# Patient Record
Sex: MALE | Race: WHITE | NOT HISPANIC OR LATINO | Employment: UNEMPLOYED | URBAN - METROPOLITAN AREA
[De-identification: names, ages, dates, MRNs, and addresses within clinical notes are randomized per-mention and may not be internally consistent; named-entity substitution may affect disease eponyms.]

---

## 2017-03-10 RX ORDER — GLIMEPIRIDE 4 MG/1
TABLET ORAL
Qty: 180 TABLET | Refills: 0 | Status: SHIPPED | OUTPATIENT
Start: 2017-03-10 | End: 2017-10-17 | Stop reason: SDUPTHER

## 2017-05-26 DIAGNOSIS — E11.9 TYPE 2 DIABETES MELLITUS WITHOUT COMPLICATION: ICD-10-CM

## 2017-10-18 RX ORDER — GLIMEPIRIDE 4 MG/1
TABLET ORAL
Qty: 180 TABLET | Refills: 0 | Status: SHIPPED | OUTPATIENT
Start: 2017-10-18 | End: 2018-01-14 | Stop reason: SDUPTHER

## 2017-10-25 ENCOUNTER — OFFICE VISIT (OUTPATIENT)
Dept: FAMILY MEDICINE | Facility: CLINIC | Age: 77
End: 2017-10-25
Payer: MEDICARE

## 2017-10-25 VITALS
TEMPERATURE: 99 F | DIASTOLIC BLOOD PRESSURE: 68 MMHG | OXYGEN SATURATION: 97 % | HEART RATE: 56 BPM | SYSTOLIC BLOOD PRESSURE: 124 MMHG | BODY MASS INDEX: 30.18 KG/M2 | HEIGHT: 72 IN | WEIGHT: 222.81 LBS

## 2017-10-25 DIAGNOSIS — I10 ESSENTIAL HYPERTENSION: ICD-10-CM

## 2017-10-25 DIAGNOSIS — Z79.4 TYPE 2 DIABETES MELLITUS WITH STAGE 3 CHRONIC KIDNEY DISEASE, WITH LONG-TERM CURRENT USE OF INSULIN: ICD-10-CM

## 2017-10-25 DIAGNOSIS — Z00.00 WELLNESS EXAMINATION: Primary | ICD-10-CM

## 2017-10-25 DIAGNOSIS — Z79.4 TYPE 2 DIABETES MELLITUS WITHOUT COMPLICATION, WITH LONG-TERM CURRENT USE OF INSULIN: ICD-10-CM

## 2017-10-25 DIAGNOSIS — H91.93 BILATERAL DEAFNESS: ICD-10-CM

## 2017-10-25 DIAGNOSIS — E78.5 HYPERLIPIDEMIA, UNSPECIFIED HYPERLIPIDEMIA TYPE: ICD-10-CM

## 2017-10-25 DIAGNOSIS — E11.9 TYPE 2 DIABETES MELLITUS WITHOUT COMPLICATION, WITH LONG-TERM CURRENT USE OF INSULIN: ICD-10-CM

## 2017-10-25 DIAGNOSIS — Z90.5 HISTORY OF NEPHRECTOMY: ICD-10-CM

## 2017-10-25 DIAGNOSIS — N18.2 CKD (CHRONIC KIDNEY DISEASE) STAGE 2, GFR 60-89 ML/MIN: ICD-10-CM

## 2017-10-25 DIAGNOSIS — N18.30 TYPE 2 DIABETES MELLITUS WITH STAGE 3 CHRONIC KIDNEY DISEASE, WITH LONG-TERM CURRENT USE OF INSULIN: ICD-10-CM

## 2017-10-25 DIAGNOSIS — E11.22 TYPE 2 DIABETES MELLITUS WITH STAGE 3 CHRONIC KIDNEY DISEASE, WITH LONG-TERM CURRENT USE OF INSULIN: ICD-10-CM

## 2017-10-25 PROCEDURE — G0008 ADMIN INFLUENZA VIRUS VAC: HCPCS | Mod: S$GLB,,, | Performed by: FAMILY MEDICINE

## 2017-10-25 PROCEDURE — G0009 ADMIN PNEUMOCOCCAL VACCINE: HCPCS | Mod: S$GLB,,, | Performed by: FAMILY MEDICINE

## 2017-10-25 PROCEDURE — 90670 PCV13 VACCINE IM: CPT | Mod: S$GLB,,, | Performed by: FAMILY MEDICINE

## 2017-10-25 PROCEDURE — 90662 IIV NO PRSV INCREASED AG IM: CPT | Mod: S$GLB,,, | Performed by: FAMILY MEDICINE

## 2017-10-25 PROCEDURE — 99397 PER PM REEVAL EST PAT 65+ YR: CPT | Mod: 25,S$GLB,, | Performed by: FAMILY MEDICINE

## 2017-10-25 RX ORDER — ALPRAZOLAM 0.5 MG/1
TABLET ORAL
COMMUNITY
Start: 2017-08-04 | End: 2017-10-25

## 2017-10-25 RX ORDER — SPIRONOLACTONE 25 MG/1
25 TABLET ORAL DAILY
Qty: 90 TABLET | Refills: 3 | Status: SHIPPED | OUTPATIENT
Start: 2017-10-25 | End: 2018-11-17 | Stop reason: SDUPTHER

## 2017-10-25 RX ORDER — CARVEDILOL 25 MG/1
12.5 TABLET ORAL 2 TIMES DAILY WITH MEALS
Qty: 180 TABLET | Refills: 3 | Status: SHIPPED | OUTPATIENT
Start: 2017-10-25 | End: 2017-12-20 | Stop reason: SDUPTHER

## 2017-10-25 RX ORDER — LISINOPRIL 40 MG/1
40 TABLET ORAL DAILY
Qty: 90 TABLET | Refills: 3 | Status: SHIPPED | OUTPATIENT
Start: 2017-10-25 | End: 2019-01-12 | Stop reason: SDUPTHER

## 2017-10-25 RX ORDER — PRAVASTATIN SODIUM 40 MG/1
20 TABLET ORAL DAILY
Qty: 90 TABLET | Refills: 3 | Status: SHIPPED | OUTPATIENT
Start: 2017-10-25 | End: 2018-01-30 | Stop reason: SDUPTHER

## 2017-10-25 NOTE — PROGRESS NOTES
Subjective:      Patient ID: Shawn Calvo is a 77 y.o. male.    Chief Complaint: No chief complaint on file.    Flu shot, wellness and wants to lose weight; bp good and DM good, 7.3 with Dr Antonio Marquez; taking less insuliin      Review of Systems   Constitutional: Negative for appetite change, fatigue, fever and unexpected weight change.   HENT: Negative for congestion, ear pain, sinus pressure and sore throat.    Eyes: Negative for pain and visual disturbance.   Respiratory: Negative for shortness of breath.    Cardiovascular: Negative for chest pain.   Gastrointestinal: Negative for abdominal pain, constipation and diarrhea.   Endocrine: Negative for polyuria.   Genitourinary: Negative for difficulty urinating and frequency.   Musculoskeletal: Negative for arthralgias, back pain and myalgias.   Skin: Negative for color change.   Allergic/Immunologic: Negative.    Neurological: Negative for syncope, weakness and headaches.   Hematological: Does not bruise/bleed easily.   Psychiatric/Behavioral: Negative for dysphoric mood and suicidal ideas. The patient is not nervous/anxious.    All other systems reviewed and are negative.    Objective:     Physical Exam   Constitutional: He is oriented to person, place, and time. He appears well-developed and well-nourished. No distress.   HENT:   Head: Normocephalic and atraumatic.   Right Ear: External ear normal.   Left Ear: External ear normal.   Mouth/Throat: Oropharynx is clear and moist. No oropharyngeal exudate.   halitosis   Eyes: Conjunctivae and EOM are normal. Pupils are equal, round, and reactive to light. Right eye exhibits no discharge. Left eye exhibits no discharge. No scleral icterus.   Neck: Normal range of motion. Neck supple. No JVD present. No tracheal deviation present. No thyromegaly present.   Cardiovascular: Normal rate and regular rhythm.  Exam reveals no gallop and no friction rub.    No murmur heard.  Pulmonary/Chest: Effort normal and breath  sounds normal. No stridor. No respiratory distress. He has no wheezes. He has no rales. He exhibits no tenderness.   Abdominal: Soft. He exhibits no distension and no mass. There is no tenderness. There is no rebound and no guarding.   Musculoskeletal: Normal range of motion. He exhibits no edema or tenderness.   Lymphadenopathy:     He has no cervical adenopathy.   Neurological: He is alert and oriented to person, place, and time. He has normal reflexes. He displays normal reflexes. No cranial nerve deficit. He exhibits normal muscle tone. Coordination normal.   Skin: Skin is warm and dry. No rash noted. He is not diaphoretic. No erythema. No pallor.   Psychiatric: He has a normal mood and affect. His behavior is normal. Judgment and thought content normal.   Nursing note and vitals reviewed.    Assessment:     1. Wellness examination    2. CKD (chronic kidney disease) stage 2, GFR 60-89 ml/min    3. Hyperlipidemia, unspecified hyperlipidemia type    4. Essential hypertension    5. History of nephrectomy    6. Type 2 diabetes mellitus without complication, with long-term current use of insulin    7. Type 2 diabetes mellitus with stage 3 chronic kidney disease, with long-term current use of insulin    8. Bilateral deafness      Plan:     New Prescriptions    No medications on file     Discontinued Medications    ALPRAZOLAM (XANAX) 0.5 MG TABLET         Modified Medications    Modified Medication Previous Medication    CARVEDILOL (COREG) 25 MG TABLET carvedilol (COREG) 25 MG tablet       Take 0.5 tablets (12.5 mg total) by mouth 2 (two) times daily with meals.    Take 0.5 tablets (12.5 mg total) by mouth 2 (two) times daily with meals.    LISINOPRIL (PRINIVIL,ZESTRIL) 40 MG TABLET lisinopril (PRINIVIL,ZESTRIL) 40 MG tablet       Take 1 tablet (40 mg total) by mouth once daily.    Take 1 tablet (40 mg total) by mouth once daily.    PRAVASTATIN (PRAVACHOL) 40 MG TABLET pravastatin (PRAVACHOL) 40 MG tablet       Take  0.5 tablets (20 mg total) by mouth once daily.    Take 0.5 tablets (20 mg total) by mouth once daily.    SPIRONOLACTONE (ALDACTONE) 25 MG TABLET spironolactone (ALDACTONE) 25 MG tablet       Take 1 tablet (25 mg total) by mouth once daily.    Take 1 tablet (25 mg total) by mouth once daily.       Wellness examination    CKD (chronic kidney disease) stage 2, GFR 60-89 ml/min  -     CBC auto differential; Future; Expected date: 10/25/2017  -     Comprehensive metabolic panel; Future; Expected date: 10/25/2017  -     Lipid panel; Future    Hyperlipidemia, unspecified hyperlipidemia type  -     CBC auto differential; Future; Expected date: 10/25/2017  -     Comprehensive metabolic panel; Future; Expected date: 10/25/2017  -     Lipid panel; Future    Essential hypertension  -     carvedilol (COREG) 25 MG tablet; Take 0.5 tablets (12.5 mg total) by mouth 2 (two) times daily with meals.  Dispense: 180 tablet; Refill: 3  -     CBC auto differential; Future; Expected date: 10/25/2017  -     Comprehensive metabolic panel; Future; Expected date: 10/25/2017  -     Lipid panel; Future    History of nephrectomy  -     CBC auto differential; Future; Expected date: 10/25/2017  -     Comprehensive metabolic panel; Future; Expected date: 10/25/2017  -     Lipid panel; Future    Type 2 diabetes mellitus without complication, with long-term current use of insulin  -     CBC auto differential; Future; Expected date: 10/25/2017  -     Comprehensive metabolic panel; Future; Expected date: 10/25/2017  -     Lipid panel; Future    Type 2 diabetes mellitus with stage 3 chronic kidney disease, with long-term current use of insulin  -     CBC auto differential; Future; Expected date: 10/25/2017  -     Comprehensive metabolic panel; Future; Expected date: 10/25/2017  -     Lipid panel; Future    Bilateral deafness  -     CBC auto differential; Future; Expected date: 10/25/2017  -     Comprehensive metabolic panel; Future; Expected date:  10/25/2017  -     Lipid panel; Future    Other orders  -     lisinopril (PRINIVIL,ZESTRIL) 40 MG tablet; Take 1 tablet (40 mg total) by mouth once daily.  Dispense: 90 tablet; Refill: 3  -     spironolactone (ALDACTONE) 25 MG tablet; Take 1 tablet (25 mg total) by mouth once daily.  Dispense: 90 tablet; Refill: 3  -     pravastatin (PRAVACHOL) 40 MG tablet; Take 0.5 tablets (20 mg total) by mouth once daily.  Dispense: 90 tablet; Refill: 3  -     Influenza - High Dose (65+) (PF) (IM)  -     Pneumococcal Conjugate Vaccine (13 Valent) (IM)

## 2017-10-26 LAB
ALBUMIN SERPL-MCNC: 4.2 G/DL (ref 3.6–5.1)
ALBUMIN/GLOB SERPL: 1.4 (CALC) (ref 1–2.5)
ALP SERPL-CCNC: 75 U/L (ref 40–115)
ALT SERPL-CCNC: 13 U/L (ref 9–46)
AST SERPL-CCNC: 17 U/L (ref 10–35)
BASOPHILS # BLD AUTO: 69 CELLS/UL (ref 0–200)
BASOPHILS NFR BLD AUTO: 1 %
BILIRUB SERPL-MCNC: 1.7 MG/DL (ref 0.2–1.2)
BUN SERPL-MCNC: 21 MG/DL (ref 7–25)
BUN/CREAT SERPL: 13 (CALC) (ref 6–22)
CALCIUM SERPL-MCNC: 9.6 MG/DL (ref 8.6–10.3)
CHLORIDE SERPL-SCNC: 105 MMOL/L (ref 98–110)
CHOLEST SERPL-MCNC: 207 MG/DL
CHOLEST/HDLC SERPL: 7.1 (CALC)
CO2 SERPL-SCNC: 26 MMOL/L (ref 20–31)
CREAT SERPL-MCNC: 1.68 MG/DL (ref 0.7–1.18)
EOSINOPHIL # BLD AUTO: 207 CELLS/UL (ref 15–500)
EOSINOPHIL NFR BLD AUTO: 3 %
ERYTHROCYTE [DISTWIDTH] IN BLOOD BY AUTOMATED COUNT: 13.2 % (ref 11–15)
GFR SERPL CREATININE-BSD FRML MDRD: 38 ML/MIN/1.73M2
GLOBULIN SER CALC-MCNC: 3 G/DL (CALC) (ref 1.9–3.7)
GLUCOSE SERPL-MCNC: 96 MG/DL (ref 65–99)
HCT VFR BLD AUTO: 41.6 % (ref 38.5–50)
HDLC SERPL-MCNC: 29 MG/DL
HGB BLD-MCNC: 13.7 G/DL (ref 13.2–17.1)
LDLC SERPL CALC-MCNC: 143 MG/DL (CALC)
LYMPHOCYTES # BLD AUTO: 1884 CELLS/UL (ref 850–3900)
LYMPHOCYTES NFR BLD AUTO: 27.3 %
MCH RBC QN AUTO: 30.5 PG (ref 27–33)
MCHC RBC AUTO-ENTMCNC: 32.9 G/DL (ref 32–36)
MCV RBC AUTO: 92.7 FL (ref 80–100)
MONOCYTES # BLD AUTO: 600 CELLS/UL (ref 200–950)
MONOCYTES NFR BLD AUTO: 8.7 %
NEUTROPHILS # BLD AUTO: 4140 CELLS/UL (ref 1500–7800)
NEUTROPHILS NFR BLD AUTO: 60 %
NONHDLC SERPL-MCNC: 178 MG/DL (CALC)
PLATELET # BLD AUTO: 189 THOUSAND/UL (ref 140–400)
PMV BLD REES-ECKER: 11.5 FL (ref 7.5–12.5)
POTASSIUM SERPL-SCNC: 4.6 MMOL/L (ref 3.5–5.3)
PROT SERPL-MCNC: 7.2 G/DL (ref 6.1–8.1)
RBC # BLD AUTO: 4.49 MILLION/UL (ref 4.2–5.8)
SODIUM SERPL-SCNC: 139 MMOL/L (ref 135–146)
TRIGL SERPL-MCNC: 201 MG/DL
WBC # BLD AUTO: 6.9 THOUSAND/UL (ref 3.8–10.8)

## 2017-11-01 ENCOUNTER — TELEPHONE (OUTPATIENT)
Dept: FAMILY MEDICINE | Facility: CLINIC | Age: 77
End: 2017-11-01

## 2017-11-01 NOTE — TELEPHONE ENCOUNTER
----- Message from Ghazala Nicole sent at 11/1/2017 11:00 AM CDT -----  Contact: [phone]  Please call with blood work results

## 2017-12-20 DIAGNOSIS — I10 ESSENTIAL HYPERTENSION: ICD-10-CM

## 2017-12-22 RX ORDER — CARVEDILOL 25 MG/1
12.5 TABLET ORAL 2 TIMES DAILY WITH MEALS
Qty: 180 TABLET | Refills: 3 | Status: SHIPPED | OUTPATIENT
Start: 2017-12-22 | End: 2021-04-16

## 2017-12-22 RX ORDER — CARVEDILOL 25 MG/1
TABLET ORAL
Qty: 180 TABLET | Refills: 3 | Status: SHIPPED | OUTPATIENT
Start: 2017-12-22 | End: 2021-04-16

## 2018-01-14 RX ORDER — GLIMEPIRIDE 4 MG/1
TABLET ORAL
Qty: 180 TABLET | Refills: 0 | Status: SHIPPED | OUTPATIENT
Start: 2018-01-14 | End: 2018-04-13 | Stop reason: SDUPTHER

## 2018-01-30 ENCOUNTER — TELEPHONE (OUTPATIENT)
Dept: FAMILY MEDICINE | Facility: CLINIC | Age: 78
End: 2018-01-30

## 2018-01-30 RX ORDER — PRAVASTATIN SODIUM 40 MG/1
40 TABLET ORAL DAILY
Qty: 90 TABLET | Refills: 3 | Status: SHIPPED | OUTPATIENT
Start: 2018-01-30 | End: 2018-07-30 | Stop reason: ALTCHOICE

## 2018-01-30 NOTE — TELEPHONE ENCOUNTER
----- Message from Ghazala Nicole sent at 1/30/2018 11:57 AM CST -----  Contact: wife Mariah   Patient is not able to take bp med Carvedilol 25mg 2xday. Started with rash. Wife uncertain if rash is from bp med or cholesterol med. Patient is also on insulin & has one kidney. Wife wants to make sure pt should be on this medication because she is reading that he shouldn't due to insulin

## 2018-04-13 DIAGNOSIS — N18.30 TYPE 2 DIABETES MELLITUS WITH STAGE 3 CHRONIC KIDNEY DISEASE, WITH LONG-TERM CURRENT USE OF INSULIN: Primary | ICD-10-CM

## 2018-04-13 DIAGNOSIS — Z79.4 TYPE 2 DIABETES MELLITUS WITH STAGE 3 CHRONIC KIDNEY DISEASE, WITH LONG-TERM CURRENT USE OF INSULIN: Primary | ICD-10-CM

## 2018-04-13 DIAGNOSIS — E11.22 TYPE 2 DIABETES MELLITUS WITH STAGE 3 CHRONIC KIDNEY DISEASE, WITH LONG-TERM CURRENT USE OF INSULIN: Primary | ICD-10-CM

## 2018-04-13 DIAGNOSIS — I10 ESSENTIAL HYPERTENSION: ICD-10-CM

## 2018-04-14 RX ORDER — GLIMEPIRIDE 4 MG/1
TABLET ORAL
Qty: 180 TABLET | Refills: 0 | Status: SHIPPED | OUTPATIENT
Start: 2018-04-14 | End: 2018-07-11 | Stop reason: SDUPTHER

## 2018-04-16 ENCOUNTER — TELEPHONE (OUTPATIENT)
Dept: ADMINISTRATIVE | Facility: HOSPITAL | Age: 78
End: 2018-04-16

## 2018-04-16 NOTE — TELEPHONE ENCOUNTER
Spoke to pt wife, states that all labs have been completed by pt endocrinologist Dr. Antonio Marquez in [place].  Records requested.

## 2018-05-11 DIAGNOSIS — N18.9 CHRONIC KIDNEY DISEASE, UNSPECIFIED CKD STAGE: ICD-10-CM

## 2018-07-15 ENCOUNTER — TELEPHONE (OUTPATIENT)
Dept: FAMILY MEDICINE | Facility: CLINIC | Age: 78
End: 2018-07-15

## 2018-07-15 DIAGNOSIS — Z79.4 TYPE 2 DIABETES MELLITUS WITHOUT COMPLICATION, WITH LONG-TERM CURRENT USE OF INSULIN: ICD-10-CM

## 2018-07-15 DIAGNOSIS — I10 ESSENTIAL HYPERTENSION: Primary | ICD-10-CM

## 2018-07-15 DIAGNOSIS — E11.9 TYPE 2 DIABETES MELLITUS WITHOUT COMPLICATION, WITH LONG-TERM CURRENT USE OF INSULIN: ICD-10-CM

## 2018-07-15 DIAGNOSIS — Z79.899 ENCOUNTER FOR LONG-TERM CURRENT USE OF MEDICATION: ICD-10-CM

## 2018-07-15 RX ORDER — GLIMEPIRIDE 4 MG/1
TABLET ORAL
Qty: 180 TABLET | Refills: 1 | Status: SHIPPED | OUTPATIENT
Start: 2018-07-15 | End: 2019-01-17 | Stop reason: SDUPTHER

## 2018-07-17 NOTE — TELEPHONE ENCOUNTER
Pt states that his A1c was drawn by Dr Marquez, but he will go in for all of his other testing.  His wife will have them fax us a copy of the A1c.     He is requesting a TSH as well.

## 2018-07-19 ENCOUNTER — LAB VISIT (OUTPATIENT)
Dept: LAB | Facility: HOSPITAL | Age: 78
End: 2018-07-19
Attending: FAMILY MEDICINE
Payer: MEDICARE

## 2018-07-19 DIAGNOSIS — E11.9 TYPE 2 DIABETES MELLITUS WITHOUT COMPLICATION, WITH LONG-TERM CURRENT USE OF INSULIN: ICD-10-CM

## 2018-07-19 DIAGNOSIS — E11.22 TYPE 2 DIABETES MELLITUS WITH STAGE 3 CHRONIC KIDNEY DISEASE, WITH LONG-TERM CURRENT USE OF INSULIN: ICD-10-CM

## 2018-07-19 DIAGNOSIS — Z79.899 ENCOUNTER FOR LONG-TERM CURRENT USE OF MEDICATION: ICD-10-CM

## 2018-07-19 DIAGNOSIS — N18.30 TYPE 2 DIABETES MELLITUS WITH STAGE 3 CHRONIC KIDNEY DISEASE, WITH LONG-TERM CURRENT USE OF INSULIN: ICD-10-CM

## 2018-07-19 DIAGNOSIS — Z79.4 TYPE 2 DIABETES MELLITUS WITHOUT COMPLICATION, WITH LONG-TERM CURRENT USE OF INSULIN: ICD-10-CM

## 2018-07-19 DIAGNOSIS — I10 ESSENTIAL HYPERTENSION: ICD-10-CM

## 2018-07-19 DIAGNOSIS — Z79.4 TYPE 2 DIABETES MELLITUS WITH STAGE 3 CHRONIC KIDNEY DISEASE, WITH LONG-TERM CURRENT USE OF INSULIN: ICD-10-CM

## 2018-07-19 LAB
ALBUMIN SERPL BCP-MCNC: 4.2 G/DL
ALP SERPL-CCNC: 43 U/L
ALT SERPL W/O P-5'-P-CCNC: 26 U/L
ANION GAP SERPL CALC-SCNC: 9 MMOL/L
AST SERPL-CCNC: 28 U/L
BASOPHILS # BLD AUTO: 0.04 K/UL
BASOPHILS NFR BLD: 0.7 %
BILIRUB SERPL-MCNC: 0.8 MG/DL
BILIRUB UR QL STRIP: NEGATIVE
BUN SERPL-MCNC: 23 MG/DL
CALCIUM SERPL-MCNC: 9.5 MG/DL
CHLORIDE SERPL-SCNC: 111 MMOL/L
CHOLEST SERPL-MCNC: 172 MG/DL
CHOLEST/HDLC SERPL: 5.7 {RATIO}
CLARITY UR REFRACT.AUTO: CLEAR
CO2 SERPL-SCNC: 22 MMOL/L
COLOR UR AUTO: YELLOW
CREAT SERPL-MCNC: 2.02 MG/DL
CREAT UR-MCNC: 141 MG/DL
DIFFERENTIAL METHOD: ABNORMAL
EOSINOPHIL # BLD AUTO: 0.2 K/UL
EOSINOPHIL NFR BLD: 3 %
ERYTHROCYTE [DISTWIDTH] IN BLOOD BY AUTOMATED COUNT: 13 %
EST. GFR  (AFRICAN AMERICAN): 35.5 ML/MIN/1.73 M^2
EST. GFR  (NON AFRICAN AMERICAN): 30.7 ML/MIN/1.73 M^2
GLUCOSE SERPL-MCNC: 81 MG/DL
GLUCOSE UR QL STRIP: NEGATIVE
HCT VFR BLD AUTO: 40.7 %
HDLC SERPL-MCNC: 30 MG/DL
HDLC SERPL: 17.4 %
HGB BLD-MCNC: 13.4 G/DL
HGB UR QL STRIP: ABNORMAL
KETONES UR QL STRIP: NEGATIVE
LDLC SERPL CALC-MCNC: 122 MG/DL
LEUKOCYTE ESTERASE UR QL STRIP: NEGATIVE
LYMPHOCYTES # BLD AUTO: 1.6 K/UL
LYMPHOCYTES NFR BLD: 27.1 %
MCH RBC QN AUTO: 32.1 PG
MCHC RBC AUTO-ENTMCNC: 32.9 G/DL
MCV RBC AUTO: 97 FL
MICROALBUMIN UR DL<=1MG/L-MCNC: 22 UG/ML
MICROALBUMIN/CREATININE RATIO: 15.6 UG/MG
MONOCYTES # BLD AUTO: 0.6 K/UL
MONOCYTES NFR BLD: 9.7 %
NEUTROPHILS # BLD AUTO: 3.4 K/UL
NEUTROPHILS NFR BLD: 59.2 %
NITRITE UR QL STRIP: NEGATIVE
NONHDLC SERPL-MCNC: 142 MG/DL
PH UR STRIP: 5 [PH] (ref 5–8)
PLATELET # BLD AUTO: 182 K/UL
PMV BLD AUTO: 11.4 FL
POTASSIUM SERPL-SCNC: 4.2 MMOL/L
PROT SERPL-MCNC: 7.4 G/DL
PROT UR QL STRIP: NEGATIVE
RBC # BLD AUTO: 4.18 M/UL
SODIUM SERPL-SCNC: 142 MMOL/L
SP GR UR STRIP: 1.01 (ref 1–1.03)
TRIGL SERPL-MCNC: 100 MG/DL
TSH SERPL DL<=0.005 MIU/L-ACNC: 2.31 UIU/ML
URN SPEC COLLECT METH UR: ABNORMAL
UROBILINOGEN UR STRIP-ACNC: NEGATIVE EU/DL
WBC # BLD AUTO: 5.75 K/UL

## 2018-07-19 PROCEDURE — 36415 COLL VENOUS BLD VENIPUNCTURE: CPT | Mod: PO

## 2018-07-19 PROCEDURE — 84443 ASSAY THYROID STIM HORMONE: CPT | Mod: PO

## 2018-07-19 PROCEDURE — 80061 LIPID PANEL: CPT

## 2018-07-19 PROCEDURE — 81003 URINALYSIS AUTO W/O SCOPE: CPT | Mod: PO

## 2018-07-19 PROCEDURE — 85025 COMPLETE CBC W/AUTO DIFF WBC: CPT | Mod: PO

## 2018-07-19 PROCEDURE — 80053 COMPREHEN METABOLIC PANEL: CPT | Mod: PO

## 2018-07-19 PROCEDURE — 82043 UR ALBUMIN QUANTITATIVE: CPT | Mod: PO

## 2018-07-23 ENCOUNTER — TELEPHONE (OUTPATIENT)
Dept: FAMILY MEDICINE | Facility: CLINIC | Age: 78
End: 2018-07-23

## 2018-07-23 NOTE — TELEPHONE ENCOUNTER
----- Message from Vinay Plascencia MD sent at 7/20/2018  7:20 PM CDT -----  A1C didn't get done; needs to RTC for that

## 2018-07-30 ENCOUNTER — TELEPHONE (OUTPATIENT)
Dept: FAMILY MEDICINE | Facility: CLINIC | Age: 78
End: 2018-07-30

## 2018-07-30 DIAGNOSIS — E78.5 HYPERLIPIDEMIA, UNSPECIFIED HYPERLIPIDEMIA TYPE: Primary | ICD-10-CM

## 2018-07-30 RX ORDER — ROSUVASTATIN CALCIUM 10 MG/1
10 TABLET, COATED ORAL DAILY
Qty: 90 TABLET | Refills: 3 | Status: SHIPPED | OUTPATIENT
Start: 2018-07-30 | End: 2021-04-16

## 2018-07-30 NOTE — TELEPHONE ENCOUNTER
----- Message from Zunilda Eugene sent at 7/30/2018  2:11 PM CDT -----  Contact: [phone] wife  Patient's spouse calling in regarding test results. Please call and advise.

## 2018-07-30 NOTE — TELEPHONE ENCOUNTER
Called pts wife back to discuss labs and explain that the A1c did not get drawn and he will need to go and get that done. No answer left message on VM to call office back

## 2018-07-30 NOTE — TELEPHONE ENCOUNTER
----- Message from Zunilda Eugene sent at 7/30/2018  2:11 PM CDT -----  Contact: [phone] wife  Patient's spouse calling in regarding test results. Please call and advise.

## 2018-07-30 NOTE — TELEPHONE ENCOUNTER
Left detailed message advising patient; that we do have results from [facility] but we are missing A1c, patient advised to return call.        ----- Message from Tania Rubio sent at 7/30/2018  2:40 PM CDT -----  Contact: [phone] /self  Patient called in returning your call. The lab results are at [facility]. Dr. Antonio Phillips already has those results. Please advise.

## 2018-10-24 ENCOUNTER — CLINICAL SUPPORT (OUTPATIENT)
Dept: FAMILY MEDICINE | Facility: CLINIC | Age: 78
End: 2018-10-24
Payer: MEDICARE

## 2018-10-24 PROCEDURE — G0008 ADMIN INFLUENZA VIRUS VAC: HCPCS | Mod: S$GLB,,, | Performed by: FAMILY MEDICINE

## 2018-10-24 PROCEDURE — G0008 FLU VACCINE - HIGH DOSE (65+) PRESERVATIVE FREE IM: ICD-10-PCS | Mod: S$GLB,,, | Performed by: FAMILY MEDICINE

## 2018-10-24 PROCEDURE — 90662 IIV NO PRSV INCREASED AG IM: CPT | Mod: S$GLB,,, | Performed by: FAMILY MEDICINE

## 2018-10-24 PROCEDURE — 90662 FLU VACCINE - HIGH DOSE (65+) PRESERVATIVE FREE IM: ICD-10-PCS | Mod: S$GLB,,, | Performed by: FAMILY MEDICINE

## 2018-11-18 RX ORDER — SPIRONOLACTONE 25 MG/1
TABLET ORAL
Qty: 90 TABLET | Refills: 3 | Status: SHIPPED | OUTPATIENT
Start: 2018-11-18 | End: 2019-10-28 | Stop reason: SDUPTHER

## 2019-01-13 RX ORDER — LISINOPRIL 40 MG/1
TABLET ORAL
Qty: 90 TABLET | Refills: 1 | Status: SHIPPED | OUTPATIENT
Start: 2019-01-13 | End: 2019-07-07 | Stop reason: SDUPTHER

## 2019-01-17 RX ORDER — GLIMEPIRIDE 4 MG/1
TABLET ORAL
Qty: 180 TABLET | Refills: 1 | Status: SHIPPED | OUTPATIENT
Start: 2019-01-17 | End: 2019-07-07 | Stop reason: SDUPTHER

## 2019-01-18 DIAGNOSIS — I10 ESSENTIAL HYPERTENSION: ICD-10-CM

## 2019-01-21 RX ORDER — CARVEDILOL 25 MG/1
TABLET ORAL
Qty: 90 TABLET | Refills: 7 | Status: SHIPPED | OUTPATIENT
Start: 2019-01-21 | End: 2020-02-02

## 2019-04-05 DIAGNOSIS — N18.9 CHRONIC KIDNEY DISEASE, UNSPECIFIED CKD STAGE: ICD-10-CM

## 2019-04-10 ENCOUNTER — TELEPHONE (OUTPATIENT)
Dept: ADMINISTRATIVE | Facility: HOSPITAL | Age: 79
End: 2019-04-10

## 2019-04-10 ENCOUNTER — PATIENT OUTREACH (OUTPATIENT)
Dept: ADMINISTRATIVE | Facility: HOSPITAL | Age: 79
End: 2019-04-10

## 2019-07-07 RX ORDER — GLIMEPIRIDE 4 MG/1
TABLET ORAL
Qty: 180 TABLET | Refills: 0 | Status: SHIPPED | OUTPATIENT
Start: 2019-07-07 | End: 2019-10-13 | Stop reason: SDUPTHER

## 2019-07-07 RX ORDER — LISINOPRIL 40 MG/1
TABLET ORAL
Qty: 90 TABLET | Refills: 0 | Status: SHIPPED | OUTPATIENT
Start: 2019-07-07 | End: 2019-10-13 | Stop reason: SDUPTHER

## 2019-07-07 NOTE — LETTER
July 9, 2019    Shawn Calvo  [address] [address]  Phone: [phone]  Fax: [phone] Dear Deo Umesh:    Our records indicate that it is time for you to return to clinic for follow up with Dr. Plascencia. Please contact our office to schedule an appointment.    Our phone number is [phone]          Sincerely,        Dr. Plascencia's staff      Refill request   LOV 3/5/19 Ulcerative pancolitis without complication  NOV 9/5/19  Last ordered  ALPRAZolam (XANAX) 2 MG tablet 90 tablet 2 3/5/2019     Sig - Route: Take 1 tablet by mouth every 6 hours as needed for Sleep or Anxiety. - Oral

## 2019-10-14 RX ORDER — LISINOPRIL 40 MG/1
TABLET ORAL
Qty: 90 TABLET | Refills: 3 | Status: SHIPPED | OUTPATIENT
Start: 2019-10-14 | End: 2020-04-21

## 2019-10-14 RX ORDER — GLIMEPIRIDE 4 MG/1
TABLET ORAL
Qty: 180 TABLET | Refills: 3 | Status: SHIPPED | OUTPATIENT
Start: 2019-10-14 | End: 2020-12-09

## 2019-10-29 RX ORDER — SPIRONOLACTONE 25 MG/1
TABLET ORAL
Qty: 90 TABLET | Refills: 3 | Status: SHIPPED | OUTPATIENT
Start: 2019-10-29 | End: 2021-04-16

## 2019-11-26 ENCOUNTER — PATIENT OUTREACH (OUTPATIENT)
Dept: ADMINISTRATIVE | Facility: HOSPITAL | Age: 79
End: 2019-11-26

## 2020-02-02 DIAGNOSIS — I10 ESSENTIAL HYPERTENSION: ICD-10-CM

## 2020-02-02 RX ORDER — CARVEDILOL 25 MG/1
TABLET ORAL
Qty: 90 TABLET | Refills: 3 | Status: SHIPPED | OUTPATIENT
Start: 2020-02-02 | End: 2020-11-17

## 2020-04-21 RX ORDER — LISINOPRIL 40 MG/1
TABLET ORAL
Qty: 90 TABLET | Refills: 3 | Status: SHIPPED | OUTPATIENT
Start: 2020-04-21 | End: 2021-06-07

## 2020-04-23 ENCOUNTER — TELEPHONE (OUTPATIENT)
Dept: FAMILY MEDICINE | Facility: CLINIC | Age: 80
End: 2020-04-23

## 2020-04-23 NOTE — TELEPHONE ENCOUNTER
[facility] pharmacy is wanting to notify you that there is a drug interaction between Lisinopril and Spironolactone. There is an increased risk of hypercalcemia. Is it okay for pt to take both medications?    [facility] pharmacy: [phone]

## 2020-10-07 ENCOUNTER — PATIENT OUTREACH (OUTPATIENT)
Dept: ADMINISTRATIVE | Facility: HOSPITAL | Age: 80
End: 2020-10-07

## 2021-02-01 ENCOUNTER — TELEPHONE (OUTPATIENT)
Dept: FAMILY MEDICINE | Facility: CLINIC | Age: 81
End: 2021-02-01

## 2021-02-01 DIAGNOSIS — I10 ESSENTIAL HYPERTENSION: ICD-10-CM

## 2021-02-02 RX ORDER — CARVEDILOL 25 MG/1
TABLET ORAL
Qty: 90 TABLET | Refills: 0 | Status: SHIPPED | OUTPATIENT
Start: 2021-02-02 | End: 2021-05-07

## 2021-02-17 LAB — HBA1C MFR BLD: 7.3 % (ref 4–6)

## 2021-03-17 ENCOUNTER — PATIENT OUTREACH (OUTPATIENT)
Dept: ADMINISTRATIVE | Facility: HOSPITAL | Age: 81
End: 2021-03-17

## 2021-03-17 DIAGNOSIS — N18.31 TYPE 2 DIABETES MELLITUS WITH STAGE 3A CHRONIC KIDNEY DISEASE, WITH LONG-TERM CURRENT USE OF INSULIN: Primary | ICD-10-CM

## 2021-03-17 DIAGNOSIS — E11.22 TYPE 2 DIABETES MELLITUS WITH STAGE 3A CHRONIC KIDNEY DISEASE, WITH LONG-TERM CURRENT USE OF INSULIN: Primary | ICD-10-CM

## 2021-03-17 DIAGNOSIS — Z79.4 TYPE 2 DIABETES MELLITUS WITH STAGE 3A CHRONIC KIDNEY DISEASE, WITH LONG-TERM CURRENT USE OF INSULIN: Primary | ICD-10-CM

## 2021-04-12 ENCOUNTER — PATIENT OUTREACH (OUTPATIENT)
Dept: ADMINISTRATIVE | Facility: HOSPITAL | Age: 81
End: 2021-04-12

## 2021-04-16 ENCOUNTER — OFFICE VISIT (OUTPATIENT)
Dept: FAMILY MEDICINE | Facility: CLINIC | Age: 81
End: 2021-04-16
Payer: MEDICARE

## 2021-04-16 VITALS
TEMPERATURE: 98 F | SYSTOLIC BLOOD PRESSURE: 124 MMHG | HEART RATE: 59 BPM | BODY MASS INDEX: 29.8 KG/M2 | HEIGHT: 72 IN | OXYGEN SATURATION: 98 % | WEIGHT: 220 LBS | DIASTOLIC BLOOD PRESSURE: 86 MMHG

## 2021-04-16 DIAGNOSIS — Z00.00 WELLNESS EXAMINATION: Primary | ICD-10-CM

## 2021-04-16 DIAGNOSIS — J34.89 LESION OF NOSE: ICD-10-CM

## 2021-04-16 PROCEDURE — 99203 OFFICE O/P NEW LOW 30 MIN: CPT | Mod: S$GLB,,, | Performed by: FAMILY MEDICINE

## 2021-04-16 PROCEDURE — 99203 PR OFFICE/OUTPT VISIT, NEW, LEVL III, 30-44 MIN: ICD-10-PCS | Mod: S$GLB,,, | Performed by: FAMILY MEDICINE

## 2021-04-16 RX ORDER — LOSARTAN POTASSIUM 100 MG/1
100 TABLET ORAL DAILY
COMMUNITY
Start: 2021-03-29

## 2021-04-16 RX ORDER — PRAVASTATIN SODIUM 80 MG/1
80 TABLET ORAL DAILY
COMMUNITY

## 2021-05-06 DIAGNOSIS — I10 ESSENTIAL HYPERTENSION: ICD-10-CM

## 2021-05-07 RX ORDER — CARVEDILOL 25 MG/1
TABLET ORAL
Qty: 90 TABLET | Refills: 3 | Status: SHIPPED | OUTPATIENT
Start: 2021-05-07 | End: 2021-05-12 | Stop reason: SDUPTHER

## 2021-05-12 DIAGNOSIS — I10 ESSENTIAL HYPERTENSION: ICD-10-CM

## 2021-05-13 RX ORDER — CARVEDILOL 25 MG/1
TABLET ORAL
Qty: 90 TABLET | Refills: 3 | Status: SHIPPED | OUTPATIENT
Start: 2021-05-13 | End: 2022-07-27

## 2021-09-15 RX ORDER — LISINOPRIL 40 MG/1
TABLET ORAL
Qty: 90 TABLET | Refills: 1 | Status: SHIPPED | OUTPATIENT
Start: 2021-09-15 | End: 2022-03-11

## 2022-01-18 LAB — HBA1C MFR BLD: 7.5 %

## 2022-02-04 NOTE — TELEPHONE ENCOUNTER
Care Due:                  Date            Visit Type   Department     Provider  --------------------------------------------------------------------------------                                EP -                              PRIMARY      [facility] FAMILY  Last Visit: 04-      CARE ([facility])   MEDICINE       Vinay Plascencia  Next Visit: None Scheduled  None         None Found                                                            Last  Test          Frequency    Reason                     Performed    Due Date  --------------------------------------------------------------------------------    Office Visit  12 months..  glimepiride, lisinopriL..  04- 04-    CMP.........  12 months..  glimepiride, lisinopriL..  Not Found    Overdue    HBA1C.......  6 months...  glimepiride..............  02- 08-    Powered by [employer]. Reference number: [id number].   2/04/2022 11:22:01 AM CST   Patient attended Phase 2 Cardiac Rehab Exercise Session. Further documentation will be completed in [facility] [facility] and will be scanned into the medical record upon discharge.

## 2022-02-07 RX ORDER — GLIMEPIRIDE 4 MG/1
TABLET ORAL
Qty: 180 TABLET | Refills: 0 | Status: SHIPPED | OUTPATIENT
Start: 2022-02-07 | End: 2022-05-22

## 2022-02-09 NOTE — TELEPHONE ENCOUNTER
Called and spoke to wife.  She is requesting that you order his complete labs at [facility].      He will see you in April.     I also spoke to Dr Marquez's office to send the most recent labs.

## 2022-02-11 DIAGNOSIS — E11.22 TYPE 2 DIABETES MELLITUS WITH STAGE 3A CHRONIC KIDNEY DISEASE, WITH LONG-TERM CURRENT USE OF INSULIN: Primary | ICD-10-CM

## 2022-02-11 DIAGNOSIS — N18.31 TYPE 2 DIABETES MELLITUS WITH STAGE 3A CHRONIC KIDNEY DISEASE, WITH LONG-TERM CURRENT USE OF INSULIN: Primary | ICD-10-CM

## 2022-02-11 DIAGNOSIS — Z79.4 TYPE 2 DIABETES MELLITUS WITH STAGE 3A CHRONIC KIDNEY DISEASE, WITH LONG-TERM CURRENT USE OF INSULIN: Primary | ICD-10-CM

## 2022-02-22 ENCOUNTER — PATIENT OUTREACH (OUTPATIENT)
Dept: ADMINISTRATIVE | Facility: HOSPITAL | Age: 82
End: 2022-02-22
Payer: MEDICARE

## 2022-03-10 NOTE — TELEPHONE ENCOUNTER
No new care gaps identified.  Powered by [employer]. Reference number: [id number].   3/10/2022 9:51:01 AM CST

## 2022-03-10 NOTE — TELEPHONE ENCOUNTER
This Rx Request does not qualify for assessment with the [facility]   Please review protocol details and the Care Due Message for extra clinical information    Reasons Rx Request may be deferred:  1. Labs/Vitals overdue  2. Labs/Vitals abnormal  3. Patient has been seen in the ED/Hospital since the last PCP visit  4. Medication is non-delegated  5. Medication associated diagnosis code is outside of scope  6. Provider is a non-participating provider  7. Visit criteria not met (Patient needs to be seen at least every 15 months by PCP)    Note composed:10:00 AM 03/10/2022

## 2022-03-11 RX ORDER — LISINOPRIL 40 MG/1
TABLET ORAL
Qty: 90 TABLET | Refills: 3 | Status: SHIPPED | OUTPATIENT
Start: 2022-03-11

## 2022-03-24 ENCOUNTER — TELEPHONE (OUTPATIENT)
Dept: FAMILY MEDICINE | Facility: CLINIC | Age: 82
End: 2022-03-24
Payer: MEDICARE

## 2022-03-24 NOTE — TELEPHONE ENCOUNTER
Pt's wife called to inform you that pt is being followed by Dr. Marquez- endocrinology. The pt does not want you to order any of his diabetic labs, as he will be having them drawn late April for Dr. Marquez. Pt is scheduled for a visit with you on 4/6/22.

## 2022-03-24 NOTE — TELEPHONE ENCOUNTER
----- Message from Loli Bonilla sent at 3/24/2022  9:57 AM CDT -----  Type:  Needs Medical Advice    Who Called:  pt  Symptoms (please be specific):  would like to discuss Fasting lab appt     Would the patient rather a call back or a response via [facility]?  Call   Best Call Back Number:  [phone]  Additional Information:

## 2022-03-28 ENCOUNTER — TELEPHONE (OUTPATIENT)
Dept: ADMINISTRATIVE | Facility: HOSPITAL | Age: 82
End: 2022-03-28
Payer: MEDICARE

## 2022-03-28 NOTE — TELEPHONE ENCOUNTER
Attempted to reach patient's wife for lab appointment clarification, as Dr Plascencia did order other labs besides the diabetic labs. Wanted to know if the patient would get the other labs drawn. No answer. Left message.

## 2022-03-29 ENCOUNTER — LAB VISIT (OUTPATIENT)
Dept: LAB | Facility: HOSPITAL | Age: 82
End: 2022-03-29
Attending: FAMILY MEDICINE
Payer: MEDICARE

## 2022-03-29 DIAGNOSIS — E11.22 TYPE 2 DIABETES MELLITUS WITH STAGE 3A CHRONIC KIDNEY DISEASE, WITH LONG-TERM CURRENT USE OF INSULIN: ICD-10-CM

## 2022-03-29 DIAGNOSIS — Z79.4 TYPE 2 DIABETES MELLITUS WITH STAGE 3A CHRONIC KIDNEY DISEASE, WITH LONG-TERM CURRENT USE OF INSULIN: ICD-10-CM

## 2022-03-29 DIAGNOSIS — N18.31 TYPE 2 DIABETES MELLITUS WITH STAGE 3A CHRONIC KIDNEY DISEASE, WITH LONG-TERM CURRENT USE OF INSULIN: ICD-10-CM

## 2022-03-29 LAB
ALBUMIN SERPL BCP-MCNC: 4.4 G/DL (ref 3.5–5.2)
ALP SERPL-CCNC: 72 U/L (ref 38–126)
ALT SERPL W/O P-5'-P-CCNC: 17 U/L (ref 10–44)
ANION GAP SERPL CALC-SCNC: 11 MMOL/L (ref 8–16)
AST SERPL-CCNC: 23 U/L (ref 15–46)
BACTERIA #/AREA URNS AUTO: NORMAL /HPF
BASOPHILS # BLD AUTO: 0.06 K/UL (ref 0–0.2)
BASOPHILS NFR BLD: 0.8 % (ref 0–1.9)
BILIRUB SERPL-MCNC: 1.6 MG/DL (ref 0.1–1)
BILIRUB UR QL STRIP: NEGATIVE
CALCIUM SERPL-MCNC: 9.9 MG/DL (ref 8.7–10.5)
CHLORIDE SERPL-SCNC: 106 MMOL/L (ref 95–110)
CHOLEST SERPL-MCNC: 172 MG/DL (ref 120–199)
CHOLEST/HDLC SERPL: 4.2 {RATIO} (ref 2–5)
CLARITY UR REFRACT.AUTO: CLEAR
CO2 SERPL-SCNC: 25 MMOL/L (ref 23–29)
COLOR UR AUTO: YELLOW
CREAT SERPL-MCNC: 1.56 MG/DL (ref 0.5–1.4)
DIFFERENTIAL METHOD: ABNORMAL
EOSINOPHIL # BLD AUTO: 0.2 K/UL (ref 0–0.5)
EOSINOPHIL NFR BLD: 2.7 % (ref 0–8)
ERYTHROCYTE [DISTWIDTH] IN BLOOD BY AUTOMATED COUNT: 12.1 % (ref 11.5–14.5)
EST. GFR  (AFRICAN AMERICAN): 47.1 ML/MIN/1.73 M^2
EST. GFR  (NON AFRICAN AMERICAN): 40.8 ML/MIN/1.73 M^2
GLUCOSE SERPL-MCNC: 144 MG/DL (ref 70–110)
GLUCOSE UR QL STRIP: NEGATIVE
HCT VFR BLD AUTO: 43.4 % (ref 40–54)
HDLC SERPL-MCNC: 41 MG/DL (ref 40–75)
HDLC SERPL: 23.8 % (ref 20–50)
HGB BLD-MCNC: 14.5 G/DL (ref 14–18)
HGB UR QL STRIP: NEGATIVE
HYALINE CASTS UR QL AUTO: 0 /LPF
IMM GRANULOCYTES # BLD AUTO: 0.04 K/UL (ref 0–0.04)
IMM GRANULOCYTES NFR BLD AUTO: 0.5 % (ref 0–0.5)
KETONES UR QL STRIP: NEGATIVE
LDLC SERPL CALC-MCNC: 109.8 MG/DL (ref 63–159)
LEUKOCYTE ESTERASE UR QL STRIP: NEGATIVE
LYMPHOCYTES # BLD AUTO: 1.7 K/UL (ref 1–4.8)
LYMPHOCYTES NFR BLD: 23.1 % (ref 18–48)
MCH RBC QN AUTO: 32.8 PG (ref 27–31)
MCHC RBC AUTO-ENTMCNC: 33.4 G/DL (ref 32–36)
MCV RBC AUTO: 98 FL (ref 82–98)
MICROSCOPIC COMMENT: NORMAL
MONOCYTES # BLD AUTO: 0.6 K/UL (ref 0.3–1)
MONOCYTES NFR BLD: 8.2 % (ref 4–15)
NEUTROPHILS # BLD AUTO: 4.8 K/UL (ref 1.8–7.7)
NEUTROPHILS NFR BLD: 64.7 % (ref 38–73)
NITRITE UR QL STRIP: NEGATIVE
NONHDLC SERPL-MCNC: 131 MG/DL
NRBC BLD-RTO: 0 /100 WBC
PH UR STRIP: 6 [PH] (ref 5–8)
PLATELET # BLD AUTO: 190 K/UL (ref 150–450)
PMV BLD AUTO: 11.4 FL (ref 9.2–12.9)
POTASSIUM SERPL-SCNC: 4.5 MMOL/L (ref 3.5–5.1)
PROT SERPL-MCNC: 7.6 G/DL (ref 6–8.4)
PROT UR QL STRIP: ABNORMAL
RBC # BLD AUTO: 4.42 M/UL (ref 4.6–6.2)
RBC #/AREA URNS AUTO: 0 /HPF (ref 0–4)
SODIUM SERPL-SCNC: 142 MMOL/L (ref 136–145)
SP GR UR STRIP: 1.01 (ref 1–1.03)
TRIGL SERPL-MCNC: 106 MG/DL (ref 30–150)
TSH SERPL DL<=0.005 MIU/L-ACNC: 2.51 UIU/ML (ref 0.4–4)
URN SPEC COLLECT METH UR: ABNORMAL
UROBILINOGEN UR STRIP-ACNC: NEGATIVE EU/DL
UUN UR-MCNC: 23 MG/DL (ref 2–20)
WBC # BLD AUTO: 7.48 K/UL (ref 3.9–12.7)
WBC #/AREA URNS AUTO: 0 /HPF (ref 0–5)

## 2022-03-29 PROCEDURE — 81000 URINALYSIS NONAUTO W/SCOPE: CPT | Mod: PO | Performed by: FAMILY MEDICINE

## 2022-03-29 PROCEDURE — 36415 COLL VENOUS BLD VENIPUNCTURE: CPT | Mod: PO | Performed by: FAMILY MEDICINE

## 2022-03-29 PROCEDURE — 80061 LIPID PANEL: CPT | Performed by: FAMILY MEDICINE

## 2022-03-29 PROCEDURE — 80053 COMPREHEN METABOLIC PANEL: CPT | Mod: PO | Performed by: FAMILY MEDICINE

## 2022-03-29 PROCEDURE — 84443 ASSAY THYROID STIM HORMONE: CPT | Mod: PO | Performed by: FAMILY MEDICINE

## 2022-03-29 PROCEDURE — 85025 COMPLETE CBC W/AUTO DIFF WBC: CPT | Mod: PO | Performed by: FAMILY MEDICINE

## 2022-04-06 ENCOUNTER — OFFICE VISIT (OUTPATIENT)
Dept: FAMILY MEDICINE | Facility: CLINIC | Age: 82
End: 2022-04-06
Payer: MEDICARE

## 2022-04-06 ENCOUNTER — TELEPHONE (OUTPATIENT)
Dept: FAMILY MEDICINE | Facility: CLINIC | Age: 82
End: 2022-04-06
Payer: MEDICARE

## 2022-04-06 ENCOUNTER — TELEPHONE (OUTPATIENT)
Dept: FAMILY MEDICINE | Facility: CLINIC | Age: 82
End: 2022-04-06

## 2022-04-06 VITALS
SYSTOLIC BLOOD PRESSURE: 138 MMHG | OXYGEN SATURATION: 97 % | HEIGHT: 72 IN | HEART RATE: 54 BPM | DIASTOLIC BLOOD PRESSURE: 78 MMHG | WEIGHT: 207.88 LBS | TEMPERATURE: 98 F | BODY MASS INDEX: 28.16 KG/M2

## 2022-04-06 DIAGNOSIS — Z12.5 ENCOUNTER FOR SCREENING FOR MALIGNANT NEOPLASM OF PROSTATE: ICD-10-CM

## 2022-04-06 DIAGNOSIS — Z00.00 WELLNESS EXAMINATION: Primary | ICD-10-CM

## 2022-04-06 DIAGNOSIS — N18.31 TYPE 2 DIABETES MELLITUS WITH STAGE 3A CHRONIC KIDNEY DISEASE, WITH LONG-TERM CURRENT USE OF INSULIN: Primary | ICD-10-CM

## 2022-04-06 DIAGNOSIS — Z79.4 TYPE 2 DIABETES MELLITUS WITH STAGE 3A CHRONIC KIDNEY DISEASE, WITH LONG-TERM CURRENT USE OF INSULIN: Primary | ICD-10-CM

## 2022-04-06 DIAGNOSIS — E11.22 TYPE 2 DIABETES MELLITUS WITH STAGE 3A CHRONIC KIDNEY DISEASE, WITH LONG-TERM CURRENT USE OF INSULIN: Primary | ICD-10-CM

## 2022-04-06 LAB
ALBUMIN/CREAT UR: 378.8 UG/MG (ref 0–30)
CREAT UR-MCNC: 33 MG/DL (ref 23–375)
MICROALBUMIN UR DL<=1MG/L-MCNC: 125 UG/ML

## 2022-04-06 PROCEDURE — 99214 OFFICE O/P EST MOD 30 MIN: CPT | Mod: S$GLB,,, | Performed by: FAMILY MEDICINE

## 2022-04-06 PROCEDURE — 99214 PR OFFICE/OUTPT VISIT, EST, LEVL IV, 30-39 MIN: ICD-10-PCS | Mod: S$GLB,,, | Performed by: FAMILY MEDICINE

## 2022-04-06 PROCEDURE — 82043 UR ALBUMIN QUANTITATIVE: CPT | Performed by: FAMILY MEDICINE

## 2022-04-06 PROCEDURE — 82570 ASSAY OF URINE CREATININE: CPT | Performed by: FAMILY MEDICINE

## 2022-04-06 RX ORDER — MUPIROCIN 20 MG/G
OINTMENT TOPICAL 2 TIMES DAILY
COMMUNITY
Start: 2022-01-07 | End: 2023-09-06

## 2022-04-06 NOTE — PROGRESS NOTES
Subjective:      Patient ID: Shawn Calvo is a 81 y.o. male.    Chief Complaint: Follow-up      Vitals:    04/06/22 1000 04/06/22 1105   BP: (!) 146/78 138/78   Pulse: (!) 54    Temp: 98 °F (36.7 °C)    TempSrc: Oral    SpO2: 97%    Weight: 94.3 kg (207 lb 14.3 oz)    Height: 6' (1.829 m)         HPI   DM check; goes to [facility]  C/o Fb sensation right nostril; has been to 2 different ENTs; edna Dozierlding damaged here, and at [facility]      Problem List  Patient Active Problem List   Diagnosis    History of nephrectomy    CKD (chronic kidney disease) stage 2, GFR 60-89 ml/min    Essential hypertension    Bilateral deafness    Hyperlipidemia    Type 2 diabetes mellitus with stage 3 chronic kidney disease, with long-term current use of insulin        ALLERGIES:   Review of patient's allergies indicates:   Allergen Reactions    Amlodipine Swelling    Hydralazine analogues Other (See Comments)     headaches    Hydrochlorothiazide Other (See Comments)     RENAL FUNTION         MEDS:   Current Outpatient Medications:     aspirin 81 MG Chew, Take 81 mg by mouth once daily., Disp: , Rfl:     carvediloL (COREG) 25 MG tablet, TAKE 1/2 (ONE-HALF) TABLET BY MOUTH TWICE DAILY WITH MEALS, Disp: 90 tablet, Rfl: 3    CONTOUR TEST STRIPS Strp, USE TO TEST BLOOD GLUCOSE TWICE DAILY, Disp: 100 each, Rfl: 0    folic acid (FOLVITE) 1 MG tablet, Take 1 mg by mouth once daily., Disp: , Rfl:     glimepiride (AMARYL) 4 MG tablet, Take 1 tablet by mouth twice daily, Disp: 180 tablet, Rfl: 0    INSULIN NPH HUMAN ISOPHANE (NOVOLIN N SUBQ), Inject 14 Units into the skin every evening., Disp: , Rfl:     latanoprost 0.005 % ophthalmic solution, Place 1 drop into the right eye every evening., Disp: , Rfl:     lisinopriL (PRINIVIL,ZESTRIL) 40 MG tablet, Take 1 tablet by mouth once daily, Disp: 90 tablet, Rfl: 3    losartan (COZAAR) 100 MG tablet, Take 100 mg by mouth once daily., Disp: , Rfl:     mupirocin (BACTROBAN)  2 % ointment, Apply topically 2 (two) times daily., Disp: , Rfl:     pravastatin (PRAVACHOL) 80 MG tablet, Take 80 mg by mouth once daily., Disp: , Rfl:     selenium 50 mcg Tab, Take 200 mcg by mouth once daily. , Disp: , Rfl:       History:  Current Providers as of 4/6/2022  PCP: Vinay Plascencia MD  Care Team Provider: Antonio Marquez MD  Care Team Provider: Antonio Mendes Jr., MD  Care Team Provider: Peter Vizcarra MD  Care Team Provider: Angel Lyn MD  Care Team Provider: Carmen Dominguez LPN  Encounter Provider: Vinay Plascencia MD, starting on Wed Apr 6, 2022 12:00 AM  Referring Provider: not found, starting on Wed Apr 6, 2022 12:00 AM  Consulting Physician: Vinay Plascencia MD, starting on Wed Apr 6, 2022  9:58 AM (Active)   Past Medical History:   Diagnosis Date    Chronic renal impairment     Diabetes mellitus, type 2     Hyperlipidemia     Hypertension      Past Surgical History:   Procedure Laterality Date    CATARACT EXTRACTION      CHOLECYSTECTOMY      COLONOSCOPY      2012-- repeat in 10 years    NEPHRECTOMY Right      Social History     Tobacco Use    Smoking status: Former Smoker    Smokeless tobacco: Never Used   Substance Use Topics    Alcohol use: No         Review of Systems   Constitutional: Negative for appetite change, fatigue, fever and unexpected weight change.   HENT: Negative for congestion, ear pain, sinus pressure and sore throat.    Eyes: Negative for pain and visual disturbance.   Respiratory: Negative for shortness of breath.    Cardiovascular: Negative for chest pain.   Gastrointestinal: Negative for abdominal pain, constipation and diarrhea.   Endocrine: Negative for polyuria.   Genitourinary: Negative for difficulty urinating and frequency.   Musculoskeletal: Negative for arthralgias, back pain and myalgias.   Skin: Negative for color change.   Allergic/Immunologic: Negative.    Neurological: Negative for syncope, weakness and headaches.   Hematological:  Does not bruise/bleed easily.   Psychiatric/Behavioral: Negative for dysphoric mood and suicidal ideas. The patient is not nervous/anxious.    All other systems reviewed and are negative.    Objective:     Physical Exam  Constitutional:       General: He is not in acute distress.     Appearance: Normal appearance. He is normal weight. He is not ill-appearing, toxic-appearing or diaphoretic.   HENT:      Right Ear: Tympanic membrane and ear canal normal.      Left Ear: Tympanic membrane and ear canal normal.      Nose: Nose normal. No congestion or rhinorrhea.      Mouth/Throat:      Mouth: Mucous membranes are dry.   Eyes:      General: No scleral icterus.        Right eye: No discharge.         Left eye: No discharge.      Extraocular Movements: Extraocular movements intact.      Conjunctiva/sclera: Conjunctivae normal.      Pupils: Pupils are equal, round, and reactive to light.   Cardiovascular:      Rate and Rhythm: Normal rate.      Pulses: Normal pulses.   Pulmonary:      Effort: Pulmonary effort is normal.      Breath sounds: Normal breath sounds.   Abdominal:      General: Abdomen is flat.      Palpations: Abdomen is soft.   Musculoskeletal:         General: No swelling, tenderness, deformity or signs of injury.      Right lower leg: No edema.      Left lower leg: No edema.   Skin:     General: Skin is warm and dry.   Neurological:      General: No focal deficit present.      Mental Status: He is alert and oriented to person, place, and time. Mental status is at baseline.      Motor: No weakness.      Coordination: Coordination normal.      Gait: Gait normal.   Psychiatric:         Mood and Affect: Mood normal.         Behavior: Behavior normal.         Thought Content: Thought content normal.         Judgment: Judgment normal.             Assessment:     1. Type 2 diabetes mellitus with stage 3a chronic kidney disease, with long-term current use of insulin      Plan:        Medication List          Accurate  as of April 6, 2022 11:59 PM. If you have any questions, ask your nurse or doctor.            CONTINUE taking these medications    aspirin 81 MG Chew     carvediloL 25 MG tablet  Commonly known as: COREG  TAKE 1/2 (ONE-HALF) TABLET BY MOUTH TWICE DAILY WITH MEALS     CONTOUR TEST STRIPS Strp  Generic drug: blood sugar diagnostic  USE TO TEST BLOOD GLUCOSE TWICE DAILY     folic acid 1 MG tablet  Commonly known as: FOLVITE     glimepiride 4 MG tablet  Commonly known as: AMARYL  Take 1 tablet by mouth twice daily     latanoprost 0.005 % ophthalmic solution     lisinopriL 40 MG tablet  Commonly known as: PRINIVIL,ZESTRIL  Take 1 tablet by mouth once daily     losartan 100 MG tablet  Commonly known as: COZAAR     mupirocin 2 % ointment  Commonly known as: BACTROBAN     NOVOLIN N SUBQ     pravastatin 80 MG tablet  Commonly known as: PRAVACHOL     selenium 50 mcg Tab          Type 2 diabetes mellitus with stage 3a chronic kidney disease, with long-term current use of insulin  -     Microalbumin/Creatinine Ratio, Urine; Future; Expected date: 04/06/2022

## 2022-04-08 ENCOUNTER — LAB VISIT (OUTPATIENT)
Dept: LAB | Facility: HOSPITAL | Age: 82
End: 2022-04-08
Attending: FAMILY MEDICINE
Payer: MEDICARE

## 2022-04-08 DIAGNOSIS — Z12.5 ENCOUNTER FOR SCREENING FOR MALIGNANT NEOPLASM OF PROSTATE: ICD-10-CM

## 2022-04-08 DIAGNOSIS — Z00.00 WELLNESS EXAMINATION: ICD-10-CM

## 2022-04-08 LAB — COMPLEXED PSA SERPL-MCNC: 4 NG/ML (ref 0–4)

## 2022-04-08 PROCEDURE — 84153 ASSAY OF PSA TOTAL: CPT | Performed by: FAMILY MEDICINE

## 2022-04-08 PROCEDURE — 36415 COLL VENOUS BLD VENIPUNCTURE: CPT | Mod: PO | Performed by: FAMILY MEDICINE

## 2022-04-16 NOTE — PROGRESS NOTES
Subjective:      Patient ID: Shawn Calvo is a 81 y.o. male.    Chief Complaint: Follow-up      Vitals:    04/06/22 1000 04/06/22 1105   BP: (!) 146/78 138/78   Pulse: (!) 54    Temp: 98 °F (36.7 °C)    TempSrc: Oral    SpO2: 97%    Weight: 94.3 kg (207 lb 14.3 oz)    Height: 6' (1.829 m)         HPI   ***    Problem List  Patient Active Problem List   Diagnosis    History of nephrectomy    CKD (chronic kidney disease) stage 2, GFR 60-89 ml/min    Essential hypertension    Bilateral deafness    Hyperlipidemia    Type 2 diabetes mellitus with stage 3 chronic kidney disease, with long-term current use of insulin        ALLERGIES:   Review of patient's allergies indicates:   Allergen Reactions    Amlodipine Swelling    Hydralazine analogues Other (See Comments)     headaches    Hydrochlorothiazide Other (See Comments)     RENAL FUNTION         MEDS:   Current Outpatient Medications:     aspirin 81 MG Chew, Take 81 mg by mouth once daily., Disp: , Rfl:     carvediloL (COREG) 25 MG tablet, TAKE 1/2 (ONE-HALF) TABLET BY MOUTH TWICE DAILY WITH MEALS, Disp: 90 tablet, Rfl: 3    CONTOUR TEST STRIPS Strp, USE TO TEST BLOOD GLUCOSE TWICE DAILY, Disp: 100 each, Rfl: 0    folic acid (FOLVITE) 1 MG tablet, Take 1 mg by mouth once daily., Disp: , Rfl:     glimepiride (AMARYL) 4 MG tablet, Take 1 tablet by mouth twice daily, Disp: 180 tablet, Rfl: 0    INSULIN NPH HUMAN ISOPHANE (NOVOLIN N SUBQ), Inject 14 Units into the skin every evening., Disp: , Rfl:     latanoprost 0.005 % ophthalmic solution, Place 1 drop into the right eye every evening., Disp: , Rfl:     lisinopriL (PRINIVIL,ZESTRIL) 40 MG tablet, Take 1 tablet by mouth once daily, Disp: 90 tablet, Rfl: 3    losartan (COZAAR) 100 MG tablet, Take 100 mg by mouth once daily., Disp: , Rfl:     mupirocin (BACTROBAN) 2 % ointment, Apply topically 2 (two) times daily., Disp: , Rfl:     pravastatin (PRAVACHOL) 80 MG tablet, Take 80 mg by mouth once daily.,  Disp: , Rfl:     selenium 50 mcg Tab, Take 200 mcg by mouth once daily. , Disp: , Rfl:       History:  Current Providers as of 4/6/2022  PCP: Vinay Plascencia MD  Care Team Provider: Antonio Marquez MD  Care Team Provider: Antonio Mendes Jr., MD  Care Team Provider: Peter Vizcarra MD  Care Team Provider: Angel Lyn MD  Care Team Provider: Carmen Dominguez LPN  Encounter Provider: Vinay Plascencia MD, starting on Wed Apr 6, 2022 12:00 AM  Referring Provider: not found, starting on Wed Apr 6, 2022 12:00 AM  Consulting Physician: Vinay Plascencia MD, starting on Wed Apr 6, 2022  9:58 AM (Active)   Past Medical History:   Diagnosis Date    Chronic renal impairment     Diabetes mellitus, type 2     Hyperlipidemia     Hypertension      Past Surgical History:   Procedure Laterality Date    CATARACT EXTRACTION      CHOLECYSTECTOMY      COLONOSCOPY      2012-- repeat in 10 years    NEPHRECTOMY Right      Social History     Tobacco Use    Smoking status: Former Smoker    Smokeless tobacco: Never Used   Substance Use Topics    Alcohol use: No         Review of Systems   Constitutional: Negative for appetite change, fatigue, fever and unexpected weight change.   HENT: Negative for congestion, ear pain, sinus pressure and sore throat.    Eyes: Negative for pain and visual disturbance.   Respiratory: Negative for shortness of breath.    Cardiovascular: Negative for chest pain.   Gastrointestinal: Negative for abdominal pain, constipation and diarrhea.   Endocrine: Negative for polyuria.   Genitourinary: Negative for difficulty urinating and frequency.   Musculoskeletal: Negative for arthralgias, back pain and myalgias.   Skin: Negative for color change.   Allergic/Immunologic: Negative.    Neurological: Negative for syncope, weakness and headaches.   Hematological: Does not bruise/bleed easily.   Psychiatric/Behavioral: Negative for dysphoric mood and suicidal ideas. The patient is not nervous/anxious.     All other systems reviewed and are negative.    Objective:     Physical Exam  Vitals and nursing note reviewed.   Constitutional:       General: He is not in acute distress.     Appearance: He is well-developed. He is not diaphoretic.   HENT:      Head: Normocephalic and atraumatic.      Right Ear: External ear normal.      Left Ear: External ear normal.      Mouth/Throat:      Pharynx: No oropharyngeal exudate.   Eyes:      General: No scleral icterus.        Right eye: No discharge.         Left eye: No discharge.      Conjunctiva/sclera: Conjunctivae normal.      Pupils: Pupils are equal, round, and reactive to light.   Neck:      Thyroid: No thyromegaly.      Vascular: No JVD.      Trachea: No tracheal deviation.   Cardiovascular:      Rate and Rhythm: Normal rate and regular rhythm.      Heart sounds: No murmur heard.    No friction rub. No gallop.   Pulmonary:      Effort: Pulmonary effort is normal. No respiratory distress.      Breath sounds: Normal breath sounds. No stridor. No wheezing or rales.   Chest:      Chest wall: No tenderness.   Abdominal:      General: There is no distension.      Palpations: Abdomen is soft. There is no mass.      Tenderness: There is no abdominal tenderness. There is no guarding or rebound.   Musculoskeletal:         General: No tenderness. Normal range of motion.      Cervical back: Normal range of motion and neck supple.   Lymphadenopathy:      Cervical: No cervical adenopathy.   Skin:     General: Skin is warm and dry.      Coloration: Skin is not pale.      Findings: No erythema or rash.   Neurological:      Mental Status: He is alert and oriented to person, place, and time.      Cranial Nerves: No cranial nerve deficit.      Motor: No abnormal muscle tone.      Coordination: Coordination normal.      Deep Tendon Reflexes: Reflexes are normal and symmetric. Reflexes normal.   Psychiatric:         Behavior: Behavior normal.         Thought Content: Thought content normal.          Judgment: Judgment normal.             Assessment:     1. Type 2 diabetes mellitus with stage 3a chronic kidney disease, with long-term current use of insulin      Plan:        Medication List          Accurate as of April 6, 2022 11:59 PM. If you have any questions, ask your nurse or doctor.            CONTINUE taking these medications    aspirin 81 MG Chew     carvediloL 25 MG tablet  Commonly known as: COREG  TAKE 1/2 (ONE-HALF) TABLET BY MOUTH TWICE DAILY WITH MEALS     CONTOUR TEST STRIPS Strp  Generic drug: blood sugar diagnostic  USE TO TEST BLOOD GLUCOSE TWICE DAILY     folic acid 1 MG tablet  Commonly known as: FOLVITE     glimepiride 4 MG tablet  Commonly known as: AMARYL  Take 1 tablet by mouth twice daily     latanoprost 0.005 % ophthalmic solution     lisinopriL 40 MG tablet  Commonly known as: PRINIVIL,ZESTRIL  Take 1 tablet by mouth once daily     losartan 100 MG tablet  Commonly known as: COZAAR     mupirocin 2 % ointment  Commonly known as: BACTROBAN     NOVOLIN N SUBQ     pravastatin 80 MG tablet  Commonly known as: PRAVACHOL     selenium 50 mcg Tab          Type 2 diabetes mellitus with stage 3a chronic kidney disease, with long-term current use of insulin  -     Microalbumin/Creatinine Ratio, Urine; Future; Expected date: 04/06/2022

## 2022-04-27 LAB
LEFT EYE DM RETINOPATHY: POSITIVE
RIGHT EYE DM RETINOPATHY: POSITIVE

## 2022-04-28 ENCOUNTER — PATIENT OUTREACH (OUTPATIENT)
Dept: ADMINISTRATIVE | Facility: HOSPITAL | Age: 82
End: 2022-04-28
Payer: MEDICARE

## 2022-05-21 NOTE — TELEPHONE ENCOUNTER
Refill Routing Note   Medication(s) are not appropriate for processing by [facility] for the following reason(s):      - Required laboratory values are abnormal  - Patient has been seen in the ED/Hospital since the last PCP visit    [facility] action(s):  Defer          Medication reconciliation completed: No     Appointments  past 12m or future 3m with PCP    Date Provider   Last Visit   4/6/2022 Viany Plascencia MD   Next Visit   Visit date not found Vinay Plascencia MD   ED visits in past 90 days: 0        Note composed:4:01 PM 05/21/2022

## 2022-05-22 RX ORDER — GLIMEPIRIDE 4 MG/1
TABLET ORAL
Qty: 180 TABLET | Refills: 0 | Status: SHIPPED | OUTPATIENT
Start: 2022-05-22 | End: 2022-11-02 | Stop reason: SDUPTHER

## 2022-05-24 NOTE — TELEPHONE ENCOUNTER
Assessment & Plan   (F41.1) YESENIA (generalized anxiety disorder)  (primary encounter diagnosis)  Comment: doing well on current regime, no problems with medications.   Plan: escitalopram (LEXAPRO) 10 MG tablet          (N94.6) Dysmenorrhea  Comment: stable, declines STI screening  Plan: desogestrel-ethinyl estradiol (APRI) 0.15-30         MG-MCG tablet                      Follow Up  Return in about 6 months (around 11/24/2022) for Depression and/or anxiety.  If not improving or if worsening    Susana Paulson, LEVON CNP        Subjective   Eri is a 15 year old who presents for the following health issues     HPI     Mental Health Follow-up Visit for Anxiety     How is your mood today? God     Change in symptoms since last visit: better    New symptoms since last visit:  None     Problems taking medications: No    Who else is on your mental health care team? none    +++++++++++++++++++++++++++++++++++++++++++++++++++++++++++++++    PHQ 4/21/2021 2/17/2022 5/24/2022   PHQ-9 Total Score - 2 4   Q9: Thoughts of better off dead/self-harm past 2 weeks - Not at all Not at all   PHQ-A Total Score 8 - -   PHQ-A Depressed most days in past year No - -   PHQ-A Mood affect on daily activities Somewhat difficult - -   PHQ-A Suicide Ideation past 2 weeks Not at all - -   PHQ-A Suicide Ideation past month No - -   PHQ-A Previous suicide attempt No - -     YESENIA-7 SCORE 2/17/2022 2/17/2022 5/24/2022   Total Score 2 (minimal anxiety) 2 (minimal anxiety) -   Total Score 2 2 3         Home and School     Have there been any big changes at home? No    Are you having challenges at school?   No  Social Supports:     Friend(s)    Sleep:    Hours of sleep on a school night:   Substance abuse:    None  Maladaptive coping strategies:    None      Suicide Assessment Five-step Evaluation and Treatment (SAFE-T)        Review of Systems   Constitutional, eye, ENT, skin, respiratory, cardiac, and GI are normal except as otherwise noted.     ----- Message from Ghazala Nicole sent at 12/20/2017  2:04 PM CST -----  Patient is requesting a medication refill.     RX name:  carvedilol (COREG) 25 MG tablet  Strength:   Quantity: 90 day with refills  Directions: Take 0.5 tablets (12.5 mg total) by mouth 2 (two) times daily with meals. - Oral    Pharmacy name:[facility]           "  Objective    /66   Pulse 89   Temp 97.9  F (36.6  C) (Tympanic)   Resp 16   Ht 1.6 m (5' 3\")   Wt 59.6 kg (131 lb 8 oz)   LMP 05/10/2022   SpO2 99%   BMI 23.29 kg/m    69 %ile (Z= 0.49) based on [facility] (Girls, 2-20 Years) weight-for-age data using vitals from 5/24/2022.  Blood pressure reading is in the normal blood pressure range based on the 2017 AAP Clinical Practice Guideline.    Physical Exam   GENERAL: Active, alert, in no acute distress.  SKIN: Clear. No significant rash, abnormal pigmentation or lesions  HEAD: Normocephalic.  EYES:  No discharge or erythema. Normal pupils and EOM.  EARS: Normal canals. Tympanic membranes are normal; gray and translucent.  NOSE: Normal without discharge.  MOUTH/THROAT: Clear. No oral lesions. Teeth intact without obvious abnormalities.  NECK: Supple, no masses.  LYMPH NODES: No adenopathy  LUNGS: Clear. No rales, rhonchi, wheezing or retractions  HEART: Regular rhythm. Normal S1/S2. No murmurs.  ABDOMEN: Soft, non-tender, not distended, no masses or hepatosplenomegaly. Bowel sounds normal.                 "

## 2022-11-02 DIAGNOSIS — I10 ESSENTIAL HYPERTENSION: ICD-10-CM

## 2022-11-02 NOTE — TELEPHONE ENCOUNTER
----- Message from Ninfa Griffith sent at 11/2/2022  2:29 PM CDT -----  Type:  RX Refill Request    Who Called:     glimepiride (AMARYL) 4 MG tablet 180 tablet 0 5/22/2022  No  Sig: Take 1 tablet by mouth twice daily  Sent to pharmacy as: glimepiride (AMARYL) 4 MG tablet  Class: Normal  Order: [id number]  Date/Time Signed: 5/22/2022 12:15      E-Prescribing Status: Receipt confirmed by pharmacy (5/22/2022 12:15 PM CDT)    carvediloL (COREG) 25 MG tablet 90 tablet 0 7/27/2022  No  Sig: TAKE 1/2 (ONE-HALF) TABLET BY MOUTH TWICE DAILY WITH MEALS  Sent to pharmacy as: carvediloL (COREG) 25 MG tablet  Class: Normal  Order: [id number]  Date/Time Signed: 7/27/2022 06:42      E-Prescribing Status: Receipt confirmed by pharmacy (7/27/2022  6:42 AM CDT)    Pharmacy  [facility] [address] [place] [address] [facility]          Would the patient rather a call back or a response via [facility]?   Best Call Back Number:  Additional Information: f/u per pharmacy

## 2022-11-04 RX ORDER — GLIMEPIRIDE 4 MG/1
4 TABLET ORAL 2 TIMES DAILY
Qty: 180 TABLET | Refills: 3 | Status: SHIPPED | OUTPATIENT
Start: 2022-11-04 | End: 2023-09-06

## 2022-11-04 RX ORDER — CARVEDILOL 25 MG/1
TABLET ORAL
Qty: 90 TABLET | Refills: 3 | Status: SHIPPED | OUTPATIENT
Start: 2022-11-04 | End: 2023-11-07

## 2023-09-01 ENCOUNTER — HOSPITAL ENCOUNTER (EMERGENCY)
Facility: HOSPITAL | Age: 83
Discharge: HOME | End: 2023-09-01
Attending: EMERGENCY MEDICINE
Payer: MEDICARE

## 2023-09-01 VITALS
HEIGHT: 72 IN | OXYGEN SATURATION: 97 % | DIASTOLIC BLOOD PRESSURE: 67 MMHG | RESPIRATION RATE: 21 BRPM | SYSTOLIC BLOOD PRESSURE: 150 MMHG | BODY MASS INDEX: 28.04 KG/M2 | HEART RATE: 72 BPM | TEMPERATURE: 98 F | WEIGHT: 207 LBS

## 2023-09-01 DIAGNOSIS — S99.919A ANKLE INJURY: ICD-10-CM

## 2023-09-01 DIAGNOSIS — E16.2 HYPOGLYCEMIA: Primary | ICD-10-CM

## 2023-09-01 LAB
ALBUMIN SERPL BCP-MCNC: 4 G/DL (ref 3.5–5.2)
ALP SERPL-CCNC: 78 U/L (ref 55–135)
ALT SERPL W/O P-5'-P-CCNC: 17 U/L (ref 10–44)
ANION GAP SERPL CALC-SCNC: 8 MMOL/L (ref 8–16)
AST SERPL-CCNC: 19 U/L (ref 10–40)
BACTERIA #/AREA URNS HPF: NORMAL /HPF
BASOPHILS # BLD AUTO: 0.07 K/UL (ref 0–0.2)
BASOPHILS NFR BLD: 0.8 % (ref 0–1.9)
BILIRUB SERPL-MCNC: 1.4 MG/DL (ref 0.1–1)
BILIRUB UR QL STRIP: NEGATIVE
BUN SERPL-MCNC: 19 MG/DL (ref 8–23)
CALCIUM SERPL-MCNC: 9.7 MG/DL (ref 8.7–10.5)
CHLORIDE SERPL-SCNC: 108 MMOL/L (ref 95–110)
CK SERPL-CCNC: 196 U/L (ref 20–200)
CLARITY UR: CLEAR
CO2 SERPL-SCNC: 23 MMOL/L (ref 23–29)
COLOR UR: COLORLESS
CREAT SERPL-MCNC: 1.7 MG/DL (ref 0.5–1.4)
DIFFERENTIAL METHOD: ABNORMAL
EOSINOPHIL # BLD AUTO: 0.3 K/UL (ref 0–0.5)
EOSINOPHIL NFR BLD: 3.1 % (ref 0–8)
ERYTHROCYTE [DISTWIDTH] IN BLOOD BY AUTOMATED COUNT: 12.6 % (ref 11.5–14.5)
EST. GFR  (NO RACE VARIABLE): 40 ML/MIN/1.73 M^2
GLUCOSE SERPL-MCNC: 186 MG/DL (ref 70–110)
GLUCOSE SERPL-MCNC: 194 MG/DL (ref 70–110)
GLUCOSE UR QL STRIP: ABNORMAL
HCT VFR BLD AUTO: 43 % (ref 40–54)
HGB BLD-MCNC: 14.5 G/DL (ref 14–18)
HGB UR QL STRIP: ABNORMAL
HYALINE CASTS #/AREA URNS LPF: 0 /LPF
IMM GRANULOCYTES # BLD AUTO: 0.09 K/UL (ref 0–0.04)
IMM GRANULOCYTES NFR BLD AUTO: 1 % (ref 0–0.5)
KETONES UR QL STRIP: NEGATIVE
LACTATE SERPL-SCNC: 1.1 MMOL/L (ref 0.5–2.2)
LEUKOCYTE ESTERASE UR QL STRIP: NEGATIVE
LYMPHOCYTES # BLD AUTO: 1.5 K/UL (ref 1–4.8)
LYMPHOCYTES NFR BLD: 16.3 % (ref 18–48)
MCH RBC QN AUTO: 32.4 PG (ref 27–31)
MCHC RBC AUTO-ENTMCNC: 33.7 G/DL (ref 32–36)
MCV RBC AUTO: 96 FL (ref 82–98)
MICROSCOPIC COMMENT: NORMAL
MONOCYTES # BLD AUTO: 0.7 K/UL (ref 0.3–1)
MONOCYTES NFR BLD: 7.7 % (ref 4–15)
NEUTROPHILS # BLD AUTO: 6.6 K/UL (ref 1.8–7.7)
NEUTROPHILS NFR BLD: 71.1 % (ref 38–73)
NITRITE UR QL STRIP: NEGATIVE
NRBC BLD-RTO: 0 /100 WBC
PH UR STRIP: 6 [PH] (ref 5–8)
PLATELET # BLD AUTO: 161 K/UL (ref 150–450)
PMV BLD AUTO: 11.3 FL (ref 9.2–12.9)
POCT GLUCOSE: 118 MG/DL (ref 70–110)
POCT GLUCOSE: 120 MG/DL (ref 70–110)
POCT GLUCOSE: 129 MG/DL (ref 70–110)
POCT GLUCOSE: 153 MG/DL (ref 70–110)
POCT GLUCOSE: 157 MG/DL (ref 70–110)
POCT GLUCOSE: 168 MG/DL (ref 70–110)
POCT GLUCOSE: 174 MG/DL (ref 70–110)
POCT GLUCOSE: 193 MG/DL (ref 70–110)
POCT GLUCOSE: 194 MG/DL (ref 70–110)
POTASSIUM SERPL-SCNC: 3.6 MMOL/L (ref 3.5–5.1)
PROT SERPL-MCNC: 7.4 G/DL (ref 6–8.4)
PROT UR QL STRIP: ABNORMAL
RBC # BLD AUTO: 4.47 M/UL (ref 4.6–6.2)
RBC #/AREA URNS HPF: 1 /HPF (ref 0–4)
SODIUM SERPL-SCNC: 139 MMOL/L (ref 136–145)
SP GR UR STRIP: 1.01 (ref 1–1.03)
SQUAMOUS #/AREA URNS HPF: 0 /HPF
URN SPEC COLLECT METH UR: ABNORMAL
UROBILINOGEN UR STRIP-ACNC: NEGATIVE EU/DL
WBC # BLD AUTO: 9.32 K/UL (ref 3.9–12.7)
WBC #/AREA URNS HPF: 1 /HPF (ref 0–5)
YEAST URNS QL MICRO: NORMAL

## 2023-09-01 PROCEDURE — 82550 ASSAY OF CK (CPK): CPT | Performed by: EMERGENCY MEDICINE

## 2023-09-01 PROCEDURE — 93005 ELECTROCARDIOGRAM TRACING: CPT

## 2023-09-01 PROCEDURE — 83605 ASSAY OF LACTIC ACID: CPT | Performed by: EMERGENCY MEDICINE

## 2023-09-01 PROCEDURE — 93010 ELECTROCARDIOGRAM REPORT: CPT | Mod: ,,, | Performed by: INTERNAL MEDICINE

## 2023-09-01 PROCEDURE — 85025 COMPLETE CBC W/AUTO DIFF WBC: CPT | Performed by: EMERGENCY MEDICINE

## 2023-09-01 PROCEDURE — 93010 EKG 12-LEAD: ICD-10-PCS | Mod: ,,, | Performed by: INTERNAL MEDICINE

## 2023-09-01 PROCEDURE — 63600175 PHARM REV CODE 636 W HCPCS: Performed by: EMERGENCY MEDICINE

## 2023-09-01 PROCEDURE — 96360 HYDRATION IV INFUSION INIT: CPT

## 2023-09-01 PROCEDURE — 81000 URINALYSIS NONAUTO W/SCOPE: CPT | Performed by: EMERGENCY MEDICINE

## 2023-09-01 PROCEDURE — 99285 EMERGENCY DEPT VISIT HI MDM: CPT

## 2023-09-01 PROCEDURE — 80053 COMPREHEN METABOLIC PANEL: CPT | Performed by: EMERGENCY MEDICINE

## 2023-09-01 PROCEDURE — 82962 GLUCOSE BLOOD TEST: CPT

## 2023-09-01 RX ADMIN — SODIUM CHLORIDE, POTASSIUM CHLORIDE, SODIUM LACTATE AND CALCIUM CHLORIDE 1000 ML: 600; 310; 30; 20 INJECTION, SOLUTION INTRAVENOUS at 03:09

## 2023-09-01 NOTE — ED NOTES
VSS, AO4, AMBULATORY, NO ACUTE DISTRESS, VERBALIZED F/U CARE AND INSTRUCTIONS, NO FURTHER QUESTIONS AT THIS TIME, NONLABORED BREATHING, PERSONAL BELONGINGS COLLECTED W/ PT AND FAM PTDC, NO RX WRITTEN.

## 2023-09-01 NOTE — ED NOTES
Family provided with educational materials about diabetes and the diabetic medications that he takes.

## 2023-09-01 NOTE — DISCHARGE INSTRUCTIONS
1.) Stop taking glimepiride immediately.  Call Dr. Plascencia's office today and schedule a recheck appointment.  Talk to him about this medication.  Continue it only if he tells you to do so.  2.) Return immediately for any new or worsening symptoms.

## 2023-09-01 NOTE — ED PROVIDER NOTES
Encounter Date: 9/1/2023       History     Chief Complaint   Patient presents with    Hypoglycemia     PT was found on floor by wife with AMS. Upon EMS arrival PT had initial CBG 32. PT was given 1 amp D50 and 1 dose glucagon with last .      Patient presents via EMS.  EMS was called by his wife after she found him on the floor in the kitchen.  The patient has a history of diabetes and takes insulin and glimepiride.  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  Per wife the patient is now at his baseline mental status.  She states that they usually eat breakfast and lunch and he usually snacks in the evening.  The patient is hard of hearing is at baseline mental status per wife.      Review of patient's allergies indicates:   Allergen Reactions    Amlodipine Swelling    Hydralazine analogues Other (See Comments)     headaches    Hydrochlorothiazide Other (See Comments)     RENAL FUNTION       Past Medical History:   Diagnosis Date    Chronic renal impairment     Diabetes mellitus, type 2     Hyperlipidemia     Hypertension      Past Surgical History:   Procedure Laterality Date    CATARACT EXTRACTION      CHOLECYSTECTOMY      COLONOSCOPY      2012-- repeat in 10 years    NEPHRECTOMY Right      Family History   Problem Relation Age of Onset    Cancer Sister         breast    Diabetes Brother     Early death Daughter     Diabetes Son     Diabetes Brother      Social History     Tobacco Use    Smoking status: Former    Smokeless tobacco: Never   Substance Use Topics    Alcohol use: No     Review of Systems   Gastrointestinal:  Negative for nausea and vomiting.       Physical Exam     Initial Vitals [09/01/23 0240]   BP Pulse Resp Temp SpO2   (!) 146/78 (!) 56 (!) 24 (!) 92.8 °F (33.8 °C) 100 %      MAP       --         Physical Exam    Vitals reviewed.  Constitutional: He appears well-developed.   HENT:   Head:  Normocephalic and atraumatic.   Normal facial exam   Eyes: EOM are normal. Pupils are equal, round, and reactive to light.   Cardiovascular:            Bradycardic rate, regular rhythm, no murmurs   Pulmonary/Chest: Breath sounds normal. He has no wheezes.   Abdominal: Abdomen is soft. He exhibits no distension. There is no abdominal tenderness. There is no rebound and no guarding.   Musculoskeletal:         General: Normal range of motion.     Neurological: He is alert and oriented to person, place, and time.   Skin: No rash noted.         ED Course   Critical Care    Date/Time: 9/1/2023 5:45 AM    Performed by: Demario Streeter DO  Authorized by: Demario Streeter DO  Direct patient critical care time: 30 minutes  Additional history critical care time: 10 minutes  Ordering / reviewing critical care time: 10 minutes  Documentation critical care time: 5 minutes  Total critical care time (exclusive of procedural time) : 55 minutes  Critical care time was exclusive of separately billable procedures and treating other patients.  Critical care was necessary to treat or prevent imminent or life-threatening deterioration of the following conditions: endocrine crisis.  Critical care was time spent personally by me on the following activities: development of treatment plan with patient or surrogate, evaluation of patient's response to treatment, examination of patient, ordering and review of laboratory studies, ordering and review of radiographic studies, re-evaluation of patient's condition and review of old charts.        Labs Reviewed   CBC W/ AUTO DIFFERENTIAL - Abnormal; Notable for the following components:       Result Value    RBC 4.47 (*)     MCH 32.4 (*)     Immature Granulocytes 1.0 (*)     Immature Grans (Abs) 0.09 (*)     Lymph % 16.3 (*)     All other components within normal limits   COMPREHENSIVE METABOLIC PANEL - Abnormal; Notable for the following components:    Glucose 186 (*)     Creatinine  1.7 (*)     Total Bilirubin 1.4 (*)     eGFR 40 (*)     All other components within normal limits   URINALYSIS, REFLEX TO URINE CULTURE - Abnormal; Notable for the following components:    Color, UA Colorless (*)     Protein, UA 1+ (*)     Glucose, UA 3+ (*)     Occult Blood UA 1+ (*)     All other components within normal limits    Narrative:     Specimen Source->Urine   POCT GLUCOSE MONITORING CONTINUOUS - Abnormal; Notable for the following components:    POC Glucose 194 (*)     All other components within normal limits   POCT GLUCOSE - Abnormal; Notable for the following components:    POCT Glucose 194 (*)     All other components within normal limits   POCT GLUCOSE - Abnormal; Notable for the following components:    POCT Glucose 153 (*)     All other components within normal limits   POCT GLUCOSE - Abnormal; Notable for the following components:    POCT Glucose 168 (*)     All other components within normal limits   POCT GLUCOSE - Abnormal; Notable for the following components:    POCT Glucose 129 (*)     All other components within normal limits   POCT GLUCOSE - Abnormal; Notable for the following components:    POCT Glucose 118 (*)     All other components within normal limits   POCT GLUCOSE - Abnormal; Notable for the following components:    POCT Glucose 120 (*)     All other components within normal limits   POCT GLUCOSE - Abnormal; Notable for the following components:    POCT Glucose 157 (*)     All other components within normal limits   POCT GLUCOSE - Abnormal; Notable for the following components:    POCT Glucose 174 (*)     All other components within normal limits   POCT GLUCOSE - Abnormal; Notable for the following components:    POCT Glucose 193 (*)     All other components within normal limits   CK   LACTIC ACID, PLASMA   URINALYSIS MICROSCOPIC    Narrative:     Specimen Source->Urine     EKG Readings: (Independently Interpreted)   Sinus bradycardia rate of 47, PVC noted, right axis, no ST or  T-wave abnormalities interpreted by me     ECG Results              EKG 12-lead (In process)  Result time 09/01/23 15:34:56      In process by Interface, Lab In [facility] (09/01/23 15:34:56)                   Narrative:    Test Reason : E16.2,    Vent. Rate : 047 BPM     Atrial Rate : 047 BPM     P-R Int : 204 ms          QRS Dur : 098 ms      QT Int : 502 ms       P-R-T Axes : 061 060 040 degrees     QTc Int : 444 ms    Sinus bradycardia with occasional Premature ventricular complexes  Otherwise normal ECG  No previous ECGs available    Referred By: [email or web address]   SELF           Confirmed By:                       In process by Interface, Lab In [facility] (09/01/23 13:38:03)                   Narrative:    Test Reason : E16.2,    Vent. Rate : 047 BPM     Atrial Rate : 047 BPM     P-R Int : 204 ms          QRS Dur : 098 ms      QT Int : 502 ms       P-R-T Axes : 061 060 040 degrees     QTc Int : 444 ms    Sinus bradycardia with occasional Premature ventricular complexes  Otherwise normal ECG  No previous ECGs available    Referred By: [email or web address]   SELF           Confirmed By:                                   Imaging Results              X-Ray Ankle Complete Left (Final result)  Result time 09/01/23 06:22:50      Final result by Rodrigue Godoy MD (09/01/23 06:22:50)                   Impression:      Mild ankle soft tissue edema, degenerative changes, and soft tissue calcifications.  No acute displaced fracture.      Electronically signed by: Rodrigue Godoy MD  Date:    09/01/2023  Time:    06:22               Narrative:    EXAMINATION:  XR ANKLE COMPLETE 3 VIEW LEFT    CLINICAL HISTORY:  Unspecified injury of unspecified ankle, initial encounter.    TECHNIQUE:  AP, lateral and oblique views of the left ankle were performed.    COMPARISON:  None.    FINDINGS:  No acute fracture or dislocation.  Minimal soft tissue edema, slightly more pronounced over the medial aspect of the ankle.  Prominent plantar calcaneal spur  and enthesopathic changes at the Achilles tendon insertion.  Prominent calcifications in the plantar soft tissues inferior to the calcaneus.  Additional small calcification in the plantar soft tissues of the midfoot.  Prominent atherosclerotic vascular calcifications in the calf and ankle arteries.  Mild soft tissue edema along the anterior aspect of the ankle.  Significant degenerative changes in the midfoot.  No unexpected radiopaque foreign body.                        Wet Read by Demario Streeter DO (09/01/23 05:53:48, [facility], Emergency Medicine)    No acute findings                                     Medications   lactated ringers bolus 1,000 mL (0 mLs Intravenous Stopped 9/1/23 6499)     Medical Decision Making  I reviewed ankle x-ray and see no evidence of acute fracture or dislocation.  I suspect that the patient has an ankle sprain however he has been able to ambulate on it with no difficulty.  Pt has had multiple glucose checks that have remained stable.  Temperature has improved suspect etiology from hypoglycemia.  Pt will be discharged with instruction to discontinue sulfonylurea immediately.  I instructed to discuss whether or not he should continue this medication with Dr. Plascencia.  I instructed that he should only start this medicine back up if Dr. Plascencia tells him to do so.  At the time discharge he has been able to tolerate both solids and liquids.  He is currently awake alert oriented and wife and daughter are at bedside.  All parties are comfortable with discharge.  Instructed patient to return immediately for any new or worsening symptoms and he verbalized understanding.    Amount and/or Complexity of Data Reviewed  Labs: ordered. Decision-making details documented in ED Course.  Radiology: ordered and independent interpretation performed.               ED Course as of 09/01/23 1625   Fri Sep 01, 2023   0311 Pt eating with wife at bedside [CD]   0326 CBC W/ AUTO  DIFFERENTIAL(!)  nonspecific [CD]   0328 POCT glucose(!)  hyperglycemia [CD]   0329 POCT glucose(!)  hyperglycemia [CD]   0337 POCT glucose(!)  hyperglycemia [CD]   0339 Lactic acid, plasma  wnl [CD]   0339 POCT glucose(!)  hyperglycemia [CD]   0344 CPK  wnl [CD]   0346 Urinalysis, Reflex to Urine Culture Urine, Clean Catch(!)  No uti [CD]   0346 Comp. Metabolic Panel(!)  nonspecific [CD]   0406 POCT glucose(!)  hyperglycemia [CD]   0535 POCT glucose(!)  hyperglycemia [CD]   0537 Pt and wife are complaining of left ankle pain, pt states that he twisted it 2 days ago and is able to ambulate on it. [CD]      ED Course User Index  [CD] Demario Streeter,                       Clinical Impression:   Final diagnoses:  [E16.2] Hypoglycemia (Primary)  [S99.919A] Ankle injury        ED Disposition Condition    Discharge Stable          ED Prescriptions    None       Follow-up Information       Follow up With Specialties Details Why Contact Info    Vinay Plascencia MD Family Medicine Schedule an appointment as soon as possible for a visit   [address] [place] [id number]  [phone]               Demario Streeter DO  09/01/23 7702

## 2023-09-05 ENCOUNTER — TELEPHONE (OUTPATIENT)
Dept: FAMILY MEDICINE | Facility: CLINIC | Age: 83
End: 2023-09-05
Payer: MEDICARE

## 2023-09-05 NOTE — TELEPHONE ENCOUNTER
----- Message from Hilary Malhotra sent at 9/5/2023  9:15 AM CDT -----  Type:  Same Day Appointment Request    Caller is requesting a same day appointment.  Caller declined first available appointment listed below.    Name of Caller: Pt wife Mariah  When is the first available appointment? N/A  Symptoms: Diabetes is down  Best Call Back Number: [phone]  Additional Information:        Patient has not been seen since 4/2022  Pt was in ER on Friday    I LM for the pt to rtn call to discuss further

## 2023-09-06 ENCOUNTER — OFFICE VISIT (OUTPATIENT)
Dept: FAMILY MEDICINE | Facility: CLINIC | Age: 83
End: 2023-09-06
Payer: MEDICARE

## 2023-09-06 ENCOUNTER — LAB VISIT (OUTPATIENT)
Dept: LAB | Facility: HOSPITAL | Age: 83
End: 2023-09-06
Attending: FAMILY MEDICINE
Payer: MEDICARE

## 2023-09-06 ENCOUNTER — PATIENT OUTREACH (OUTPATIENT)
Dept: ADMINISTRATIVE | Facility: OTHER | Age: 83
End: 2023-09-06
Payer: MEDICARE

## 2023-09-06 VITALS
OXYGEN SATURATION: 96 % | DIASTOLIC BLOOD PRESSURE: 66 MMHG | WEIGHT: 212.88 LBS | HEART RATE: 57 BPM | TEMPERATURE: 98 F | BODY MASS INDEX: 28.83 KG/M2 | SYSTOLIC BLOOD PRESSURE: 160 MMHG | HEIGHT: 72 IN

## 2023-09-06 DIAGNOSIS — Z90.5 HISTORY OF NEPHRECTOMY: ICD-10-CM

## 2023-09-06 DIAGNOSIS — L02.416 CELLULITIS AND ABSCESS OF LEFT LEG: ICD-10-CM

## 2023-09-06 DIAGNOSIS — I10 ESSENTIAL HYPERTENSION: ICD-10-CM

## 2023-09-06 DIAGNOSIS — Z79.4 TYPE 2 DIABETES MELLITUS WITH STAGE 3A CHRONIC KIDNEY DISEASE, WITH LONG-TERM CURRENT USE OF INSULIN: ICD-10-CM

## 2023-09-06 DIAGNOSIS — H91.93 BILATERAL DEAFNESS: ICD-10-CM

## 2023-09-06 DIAGNOSIS — Z12.5 ENCOUNTER FOR SCREENING FOR MALIGNANT NEOPLASM OF PROSTATE: ICD-10-CM

## 2023-09-06 DIAGNOSIS — L03.116 CELLULITIS AND ABSCESS OF LEFT LEG: ICD-10-CM

## 2023-09-06 DIAGNOSIS — C64.2 PRIMARY MALIGNANT NEOPLASM OF LEFT KIDNEY: ICD-10-CM

## 2023-09-06 DIAGNOSIS — E11.22 TYPE 2 DIABETES MELLITUS WITH STAGE 3A CHRONIC KIDNEY DISEASE, WITH LONG-TERM CURRENT USE OF INSULIN: ICD-10-CM

## 2023-09-06 DIAGNOSIS — N18.31 TYPE 2 DIABETES MELLITUS WITH STAGE 3A CHRONIC KIDNEY DISEASE, WITH LONG-TERM CURRENT USE OF INSULIN: ICD-10-CM

## 2023-09-06 DIAGNOSIS — L57.0 AK (ACTINIC KERATOSIS): ICD-10-CM

## 2023-09-06 DIAGNOSIS — L03.116 CELLULITIS AND ABSCESS OF LEFT LEG: Primary | ICD-10-CM

## 2023-09-06 DIAGNOSIS — L02.416 CELLULITIS AND ABSCESS OF LEFT LEG: Primary | ICD-10-CM

## 2023-09-06 LAB
25(OH)D3+25(OH)D2 SERPL-MCNC: 31 NG/ML (ref 30–96)
ALBUMIN SERPL BCP-MCNC: 4.3 G/DL (ref 3.5–5.2)
ALP SERPL-CCNC: 69 U/L (ref 38–126)
ALT SERPL W/O P-5'-P-CCNC: 29 U/L (ref 10–44)
ANION GAP SERPL CALC-SCNC: 8 MMOL/L (ref 8–16)
AST SERPL-CCNC: 27 U/L (ref 15–46)
BASOPHILS # BLD AUTO: 0.07 K/UL (ref 0–0.2)
BASOPHILS NFR BLD: 0.9 % (ref 0–1.9)
BILIRUB SERPL-MCNC: 2.2 MG/DL (ref 0.1–1)
CALCIUM SERPL-MCNC: 9.9 MG/DL (ref 8.7–10.5)
CHLORIDE SERPL-SCNC: 105 MMOL/L (ref 95–110)
CHOLEST SERPL-MCNC: 139 MG/DL (ref 120–199)
CHOLEST/HDLC SERPL: 3.6 {RATIO} (ref 2–5)
CO2 SERPL-SCNC: 27 MMOL/L (ref 23–29)
COMPLEXED PSA SERPL-MCNC: 4 NG/ML (ref 0–4)
CREAT SERPL-MCNC: 1.86 MG/DL (ref 0.5–1.4)
DIFFERENTIAL METHOD: ABNORMAL
EOSINOPHIL # BLD AUTO: 0.3 K/UL (ref 0–0.5)
EOSINOPHIL NFR BLD: 4.3 % (ref 0–8)
ERYTHROCYTE [DISTWIDTH] IN BLOOD BY AUTOMATED COUNT: 12.3 % (ref 11.5–14.5)
EST. GFR  (NO RACE VARIABLE): 35.5 ML/MIN/1.73 M^2
ESTIMATED AVG GLUCOSE: 166 MG/DL (ref 68–131)
GLUCOSE SERPL-MCNC: 155 MG/DL (ref 70–110)
HBA1C MFR BLD: 7.4 % (ref 4–5.6)
HCT VFR BLD AUTO: 40.4 % (ref 40–54)
HDLC SERPL-MCNC: 39 MG/DL (ref 40–75)
HDLC SERPL: 28.1 % (ref 20–50)
HGB BLD-MCNC: 13.5 G/DL (ref 14–18)
IMM GRANULOCYTES # BLD AUTO: 0.03 K/UL (ref 0–0.04)
IMM GRANULOCYTES NFR BLD AUTO: 0.4 % (ref 0–0.5)
LDLC SERPL CALC-MCNC: 84.8 MG/DL (ref 63–159)
LYMPHOCYTES # BLD AUTO: 1.7 K/UL (ref 1–4.8)
LYMPHOCYTES NFR BLD: 23.2 % (ref 18–48)
MCH RBC QN AUTO: 32.5 PG (ref 27–31)
MCHC RBC AUTO-ENTMCNC: 33.4 G/DL (ref 32–36)
MCV RBC AUTO: 97 FL (ref 82–98)
MONOCYTES # BLD AUTO: 0.7 K/UL (ref 0.3–1)
MONOCYTES NFR BLD: 9.6 % (ref 4–15)
NEUTROPHILS # BLD AUTO: 4.6 K/UL (ref 1.8–7.7)
NEUTROPHILS NFR BLD: 61.6 % (ref 38–73)
NONHDLC SERPL-MCNC: 100 MG/DL
NRBC BLD-RTO: 0 /100 WBC
NT-PROBNP SERPL-MCNC: 355 PG/ML (ref 5–1800)
PLATELET # BLD AUTO: 184 K/UL (ref 150–450)
PMV BLD AUTO: 10.7 FL (ref 9.2–12.9)
POTASSIUM SERPL-SCNC: 4.8 MMOL/L (ref 3.5–5.1)
PROT SERPL-MCNC: 7.6 G/DL (ref 6–8.4)
RBC # BLD AUTO: 4.16 M/UL (ref 4.6–6.2)
SODIUM SERPL-SCNC: 140 MMOL/L (ref 136–145)
TRIGL SERPL-MCNC: 76 MG/DL (ref 30–150)
TSH SERPL DL<=0.005 MIU/L-ACNC: 2.33 UIU/ML (ref 0.4–4)
UUN UR-MCNC: 24 MG/DL (ref 2–20)
WBC # BLD AUTO: 7.5 K/UL (ref 3.9–12.7)

## 2023-09-06 PROCEDURE — 99214 OFFICE O/P EST MOD 30 MIN: CPT | Mod: 25,S$GLB,, | Performed by: FAMILY MEDICINE

## 2023-09-06 PROCEDURE — 80053 COMPREHEN METABOLIC PANEL: CPT | Mod: PO | Performed by: FAMILY MEDICINE

## 2023-09-06 PROCEDURE — 84443 ASSAY THYROID STIM HORMONE: CPT | Mod: PO | Performed by: FAMILY MEDICINE

## 2023-09-06 PROCEDURE — 36415 COLL VENOUS BLD VENIPUNCTURE: CPT | Mod: PO | Performed by: FAMILY MEDICINE

## 2023-09-06 PROCEDURE — 83036 HEMOGLOBIN GLYCOSYLATED A1C: CPT | Performed by: FAMILY MEDICINE

## 2023-09-06 PROCEDURE — 80061 LIPID PANEL: CPT | Performed by: FAMILY MEDICINE

## 2023-09-06 PROCEDURE — 17110 DESTRUCTION, BENIGN LESION: ICD-10-PCS | Mod: S$GLB,,, | Performed by: FAMILY MEDICINE

## 2023-09-06 PROCEDURE — 99214 PR OFFICE/OUTPT VISIT, EST, LEVL IV, 30-39 MIN: ICD-10-PCS | Mod: 25,S$GLB,, | Performed by: FAMILY MEDICINE

## 2023-09-06 PROCEDURE — 84153 ASSAY OF PSA TOTAL: CPT | Performed by: FAMILY MEDICINE

## 2023-09-06 PROCEDURE — 82306 VITAMIN D 25 HYDROXY: CPT | Mod: PO | Performed by: FAMILY MEDICINE

## 2023-09-06 PROCEDURE — 83880 ASSAY OF NATRIURETIC PEPTIDE: CPT | Mod: PO | Performed by: FAMILY MEDICINE

## 2023-09-06 PROCEDURE — 17110 DESTRUCTION B9 LES UP TO 14: CPT | Mod: S$GLB,,, | Performed by: FAMILY MEDICINE

## 2023-09-06 PROCEDURE — 85025 COMPLETE CBC W/AUTO DIFF WBC: CPT | Mod: PO | Performed by: FAMILY MEDICINE

## 2023-09-06 RX ORDER — CEPHALEXIN 500 MG/1
500 CAPSULE ORAL 3 TIMES DAILY
Qty: 30 CAPSULE | Refills: 0 | Status: SHIPPED | OUTPATIENT
Start: 2023-09-06 | End: 2023-09-16

## 2023-09-06 NOTE — PROGRESS NOTES
CHW - Initial Contact    This Community Health Worker completed the Social Determinant of Health questionnaire with MRN [id number] in person today.    Pt identified barriers of most importance are: None   Referrals to community agencies completed with patient/caregiver consent outside of [facility] include: No  Referrals were put through [facility] - No  Support and Services: No support & services have been documented.  Other information discussed the patient needs / wants help with: SDOH complete. No assistance has been requested at this time.   Follow up required: No  No future outreach task assigned

## 2023-09-06 NOTE — PROGRESS NOTES
Subjective:      Patient ID: Shawn Calvo is a 82 y.o. male.    Chief Complaint: Foot Pain      Vitals:    09/06/23 0820   BP: (!) 160/66   Pulse: (!) 57   Temp: 97.8 °F (36.6 °C)   TempSrc: Oral   SpO2: 96%   Weight: 96.6 kg (212 lb 13.7 oz)   Height: 6' (1.829 m)        HPI   Rolled left ankle about one week ago in [facility], didn't hurt, but got swolllen and purple, went to [facility] and had xray  Also passed  out and must have had a low glucosxe  Xrays and klabs reviewed; has CKD, had kidney cancer  Has DM, Jimmy retired, now has [facility] for endo  Problem List  Patient Active Problem List   Diagnosis    History of nephrectomy    CKD (chronic kidney disease) stage 2, GFR 60-89 ml/min    Essential hypertension    Bilateral deafness    Hyperlipidemia    Type 2 diabetes mellitus with stage 3 chronic kidney disease, with long-term current use of insulin    Primary malignant neoplasm of left kidney    Cellulitis and abscess of left leg    AK (actinic keratosis)    Encounter for screening for malignant neoplasm of prostate        ALLERGIES:   Review of patient's allergies indicates:   Allergen Reactions    Amlodipine Swelling    Hydralazine analogues Other (See Comments)     headaches    Hydrochlorothiazide Other (See Comments)     RENAL FUNTION         MEDS:   Current Outpatient Medications:     aspirin 81 MG Chew, Take 81 mg by mouth once daily., Disp: , Rfl:     carvediloL (COREG) 25 MG tablet, TAKE 1/2 (ONE-HALF) TABLET BY MOUTH TWICE DAILY WITH MEALS, Disp: 90 tablet, Rfl: 3    CONTOUR TEST STRIPS Strp, USE TO TEST BLOOD GLUCOSE TWICE DAILY, Disp: 100 each, Rfl: 0    folic acid (FOLVITE) 1 MG tablet, Take 1 mg by mouth once daily., Disp: , Rfl:     INSULIN NPH HUMAN ISOPHANE (NOVOLIN N SUBQ), Inject 14 Units into the skin every evening., Disp: , Rfl:     latanoprost 0.005 % ophthalmic solution, Place 1 drop into the right eye every evening., Disp: , Rfl:     lisinopriL (PRINIVIL,ZESTRIL) 40 MG tablet, Take 1 tablet by  mouth once daily, Disp: 90 tablet, Rfl: 3    losartan (COZAAR) 100 MG tablet, Take 100 mg by mouth once daily., Disp: , Rfl:     pravastatin (PRAVACHOL) 80 MG tablet, Take 80 mg by mouth once daily., Disp: , Rfl:     selenium 50 mcg Tab, Take 200 mcg by mouth once daily. , Disp: , Rfl:     cephALEXin (KEFLEX) 500 MG capsule, Take 1 capsule (500 mg total) by mouth 3 (three) times daily. for 10 days, Disp: 30 capsule, Rfl: 0      History:  Current Providers as of 9/6/2023  PCP: not found  Care Team Provider: Peter Vizcarra MD  Care Team Provider: Angel Lyn MD  Care Team Provider: Carmen Dominguez LPN  Care Team Provider: Evaristo Courtney MD  Encounter Provider: Vinay Plascencia MD, starting on Wed Sep 6, 2023 12:00 AM  Referring Provider: not found, starting on Wed Sep 6, 2023 12:00 AM  Consulting Physician: Vinay Plascencia MD, starting on Wed Sep 6, 2023  8:15 AM (Active)   Past Medical History:   Diagnosis Date    Chronic renal impairment     Diabetes mellitus, type 2     Hyperlipidemia     Hypertension      Past Surgical History:   Procedure Laterality Date    CATARACT EXTRACTION      CHOLECYSTECTOMY      COLONOSCOPY      2012-- repeat in 10 years    NEPHRECTOMY Right      Social History     Tobacco Use    Smoking status: Former     Passive exposure: Past    Smokeless tobacco: Never   Substance Use Topics    Alcohol use: No         Review of Systems   Musculoskeletal:  Positive for joint swelling.   Skin:  Positive for color change and wound.   All other systems reviewed and are negative.      Objective:     Physical Exam        Assessment:     1. Cellulitis and abscess of left leg    2. Primary malignant neoplasm of left kidney    3. Type 2 diabetes mellitus with stage 3a chronic kidney disease, with long-term current use of insulin    4. Bilateral deafness    5. Essential hypertension    6. History of nephrectomy    7. Encounter for screening for malignant neoplasm of prostate    8. AK (actinic  keratosis)      Plan:        Medication List            Accurate as of September 6, 2023  8:58 AM. If you have any questions, ask your nurse or doctor.                START taking these medications      cephALEXin 500 MG capsule  Commonly known as: KEFLEX  Take 1 capsule (500 mg total) by mouth 3 (three) times daily. for 10 days  Started by: Vinay Plascencia MD            CONTINUE taking these medications      aspirin 81 MG Chew     carvediloL 25 MG tablet  Commonly known as: COREG  TAKE 1/2 (ONE-HALF) TABLET BY MOUTH TWICE DAILY WITH MEALS     CONTOUR TEST STRIPS Strp  Generic drug: blood sugar diagnostic  USE TO TEST BLOOD GLUCOSE TWICE DAILY     folic acid 1 MG tablet  Commonly known as: FOLVITE     latanoprost 0.005 % ophthalmic solution     lisinopriL 40 MG tablet  Commonly known as: PRINIVIL,ZESTRIL  Take 1 tablet by mouth once daily     losartan 100 MG tablet  Commonly known as: COZAAR     NOVOLIN N SUBQ     pravastatin 80 MG tablet  Commonly known as: PRAVACHOL     selenium 50 mcg Tab               Where to Get Your Medications        These medications were sent to [facility] [address] [place] [address] [address] [facility] [address]      Phone: [phone]   cephALEXin 500 MG capsule       Cellulitis and abscess of left leg  -     BNP; Future; Expected date: 09/06/2023  -     CBC Auto Differential; Future; Expected date: 09/06/2023  -     Comprehensive Metabolic Panel; Future; Expected date: 09/06/2023  -     Hemoglobin A1C; Future  -     Lipid Panel; Future  -     PSA, Screening; Future  -     TSH; Future  -     Urinalysis; Future  -     Vitamin D; Future    Primary malignant neoplasm of left kidney  -     BNP; Future; Expected date: 09/06/2023  -     CBC Auto Differential; Future; Expected date: 09/06/2023  -     Comprehensive Metabolic Panel; Future; Expected date: 09/06/2023  -     Hemoglobin A1C; Future  -     Lipid Panel; Future  -     PSA, Screening; Future  -     TSH;  Future  -     Urinalysis; Future  -     Vitamin D; Future    Type 2 diabetes mellitus with stage 3a chronic kidney disease, with long-term current use of insulin  -     BNP; Future; Expected date: 09/06/2023  -     CBC Auto Differential; Future; Expected date: 09/06/2023  -     Comprehensive Metabolic Panel; Future; Expected date: 09/06/2023  -     Hemoglobin A1C; Future  -     Lipid Panel; Future  -     PSA, Screening; Future  -     TSH; Future  -     Urinalysis; Future  -     Vitamin D; Future    Bilateral deafness  -     BNP; Future; Expected date: 09/06/2023  -     CBC Auto Differential; Future; Expected date: 09/06/2023  -     Comprehensive Metabolic Panel; Future; Expected date: 09/06/2023  -     Hemoglobin A1C; Future  -     Lipid Panel; Future  -     PSA, Screening; Future  -     TSH; Future  -     Urinalysis; Future  -     Vitamin D; Future    Essential hypertension  -     BNP; Future; Expected date: 09/06/2023  -     CBC Auto Differential; Future; Expected date: 09/06/2023  -     Comprehensive Metabolic Panel; Future; Expected date: 09/06/2023  -     Hemoglobin A1C; Future  -     Lipid Panel; Future  -     PSA, Screening; Future  -     TSH; Future  -     Urinalysis; Future  -     Vitamin D; Future    History of nephrectomy  -     BNP; Future; Expected date: 09/06/2023  -     CBC Auto Differential; Future; Expected date: 09/06/2023  -     Comprehensive Metabolic Panel; Future; Expected date: 09/06/2023  -     Hemoglobin A1C; Future  -     Lipid Panel; Future  -     PSA, Screening; Future  -     TSH; Future  -     Urinalysis; Future  -     Vitamin D; Future    Encounter for screening for malignant neoplasm of prostate  -     PSA, Screening; Future    AK (actinic keratosis)    Other orders  -     cephALEXin (KEFLEX) 500 MG capsule; Take 1 capsule (500 mg total) by mouth 3 (three) times daily. for 10 days  Dispense: 30 capsule; Refill: 0

## 2023-09-06 NOTE — PROCEDURES
Destruction, Benign Lesion    Date/Time: 9/6/2023 8:00 AM    Performed by: Vinay Plascencia MD  Authorized by: Vinay Plascencia MD    Consent Done?:  Yes (Verbal)  Pre-Procedure:     Timeout: prior to procedure the correct patient, procedure, and site was verified      Local anesthesia used?: No    Indications:     other  Location:     Head/Neck:  Cheek    Detail:  Left cheek and right cheek  Prep:     Position:  Supine  Procedure Details:     Cosmetic?: No      Number of lesions:  2     Patient tolerated the procedure well with no immediate complications.

## 2023-09-14 ENCOUNTER — TELEPHONE (OUTPATIENT)
Dept: FAMILY MEDICINE | Facility: CLINIC | Age: 83
End: 2023-09-14
Payer: MEDICARE

## 2023-09-14 NOTE — TELEPHONE ENCOUNTER
----- Message from Vinay Plascencia MD sent at 9/14/2023  6:49 AM CDT -----  Labs look good; no changes; repeat 6 months

## 2023-09-20 ENCOUNTER — CLINICAL SUPPORT (OUTPATIENT)
Dept: FAMILY MEDICINE | Facility: CLINIC | Age: 83
End: 2023-09-20
Payer: MEDICARE

## 2023-09-20 ENCOUNTER — PATIENT OUTREACH (OUTPATIENT)
Dept: ADMINISTRATIVE | Facility: OTHER | Age: 83
End: 2023-09-20
Payer: MEDICARE

## 2023-09-20 ENCOUNTER — TELEPHONE (OUTPATIENT)
Dept: FAMILY MEDICINE | Facility: CLINIC | Age: 83
End: 2023-09-20

## 2023-09-20 VITALS — SYSTOLIC BLOOD PRESSURE: 138 MMHG | DIASTOLIC BLOOD PRESSURE: 72 MMHG

## 2023-09-20 DIAGNOSIS — I10 ESSENTIAL HYPERTENSION: Primary | ICD-10-CM

## 2023-09-20 RX ORDER — AMOXICILLIN AND CLAVULANATE POTASSIUM 875; 125 MG/1; MG/1
1 TABLET, FILM COATED ORAL 2 TIMES DAILY
Qty: 30 TABLET | Refills: 1 | Status: SHIPPED | OUTPATIENT
Start: 2023-09-20 | End: 2024-03-10 | Stop reason: ALTCHOICE

## 2023-09-20 NOTE — PROGRESS NOTES
Shawn Calvo 82 y.o. male is here today for Blood Pressure check.   History of HTN yes.    Review of patient's allergies indicates:   Allergen Reactions    Amlodipine Swelling    Hydralazine analogues Other (See Comments)     headaches    Hydrochlorothiazide Other (See Comments)     RENAL FUNTION       Creatinine   Date Value Ref Range Status   09/06/2023 1.86 (H) 0.50 - 1.40 mg/dL Final     Sodium   Date Value Ref Range Status   09/06/2023 140 136 - 145 mmol/L Final     Potassium   Date Value Ref Range Status   09/06/2023 4.8 3.5 - 5.1 mmol/L Final   ]  Patient verifies taking blood pressure medications on a regular basis at the same time of the day.     Current Outpatient Medications:     aspirin 81 MG Chew, Take 81 mg by mouth once daily., Disp: , Rfl:     carvediloL (COREG) 25 MG tablet, TAKE 1/2 (ONE-HALF) TABLET BY MOUTH TWICE DAILY WITH MEALS, Disp: 90 tablet, Rfl: 3    CONTOUR TEST STRIPS Strp, USE TO TEST BLOOD GLUCOSE TWICE DAILY, Disp: 100 each, Rfl: 0    folic acid (FOLVITE) 1 MG tablet, Take 1 mg by mouth once daily., Disp: , Rfl:     INSULIN NPH HUMAN ISOPHANE (NOVOLIN N SUBQ), Inject 14 Units into the skin every evening., Disp: , Rfl:     latanoprost 0.005 % ophthalmic solution, Place 1 drop into the right eye every evening., Disp: , Rfl:     lisinopriL (PRINIVIL,ZESTRIL) 40 MG tablet, Take 1 tablet by mouth once daily, Disp: 90 tablet, Rfl: 3    losartan (COZAAR) 100 MG tablet, Take 100 mg by mouth once daily., Disp: , Rfl:     pravastatin (PRAVACHOL) 80 MG tablet, Take 80 mg by mouth once daily., Disp: , Rfl:     selenium 50 mcg Tab, Take 200 mcg by mouth once daily. , Disp: , Rfl:   Does patient have record of home blood pressure readings no.   Last dose of blood pressure medication was taken at this morning .  Patient is asymptomatic.         ,   .    Blood pressure reading after 15 minutes was 138/72, Pulse 72.  Dr. Plascencia  notified.

## 2023-10-31 ENCOUNTER — PATIENT OUTREACH (OUTPATIENT)
Dept: ADMINISTRATIVE | Facility: HOSPITAL | Age: 83
End: 2023-10-31
Payer: MEDICARE

## 2023-11-07 DIAGNOSIS — I10 ESSENTIAL HYPERTENSION: ICD-10-CM

## 2023-11-07 RX ORDER — CARVEDILOL 25 MG/1
TABLET ORAL
Qty: 90 TABLET | Refills: 3 | Status: SHIPPED | OUTPATIENT
Start: 2023-11-07

## 2023-11-07 NOTE — TELEPHONE ENCOUNTER
No care due was identified.  [facility] Embedded Care Due Messages. Reference number: [id number].   11/07/2023 9:09:13 AM CST

## 2023-11-07 NOTE — TELEPHONE ENCOUNTER
Refill Decision Note   Shawn Calvo  is requesting a refill authorization.  Brief Assessment and Rationale for Refill:  Approve     Medication Therapy Plan:         Comments:     Note composed:5:30 PM 11/07/2023

## 2024-03-06 ENCOUNTER — OFFICE VISIT (OUTPATIENT)
Dept: FAMILY MEDICINE | Facility: CLINIC | Age: 84
End: 2024-03-06
Payer: MEDICARE

## 2024-03-06 VITALS
SYSTOLIC BLOOD PRESSURE: 138 MMHG | OXYGEN SATURATION: 97 % | WEIGHT: 209.31 LBS | TEMPERATURE: 98 F | HEIGHT: 72 IN | HEART RATE: 51 BPM | DIASTOLIC BLOOD PRESSURE: 70 MMHG | BODY MASS INDEX: 28.35 KG/M2

## 2024-03-06 DIAGNOSIS — I10 ESSENTIAL HYPERTENSION: ICD-10-CM

## 2024-03-06 DIAGNOSIS — E11.22 TYPE 2 DIABETES MELLITUS WITH STAGE 3A CHRONIC KIDNEY DISEASE, WITH LONG-TERM CURRENT USE OF INSULIN: ICD-10-CM

## 2024-03-06 DIAGNOSIS — C64.2 PRIMARY MALIGNANT NEOPLASM OF LEFT KIDNEY: ICD-10-CM

## 2024-03-06 DIAGNOSIS — N18.32 STAGE 3B CHRONIC KIDNEY DISEASE: Primary | ICD-10-CM

## 2024-03-06 DIAGNOSIS — H91.93 BILATERAL DEAFNESS: ICD-10-CM

## 2024-03-06 DIAGNOSIS — Z79.4 TYPE 2 DIABETES MELLITUS WITH STAGE 3A CHRONIC KIDNEY DISEASE, WITH LONG-TERM CURRENT USE OF INSULIN: ICD-10-CM

## 2024-03-06 DIAGNOSIS — Z90.5 HISTORY OF NEPHRECTOMY: ICD-10-CM

## 2024-03-06 DIAGNOSIS — N18.31 TYPE 2 DIABETES MELLITUS WITH STAGE 3A CHRONIC KIDNEY DISEASE, WITH LONG-TERM CURRENT USE OF INSULIN: ICD-10-CM

## 2024-03-06 PROCEDURE — 99214 OFFICE O/P EST MOD 30 MIN: CPT | Mod: S$GLB,,, | Performed by: FAMILY MEDICINE

## 2024-03-06 RX ORDER — GLIMEPIRIDE 4 MG/1
4 TABLET ORAL
COMMUNITY
Start: 2023-12-12

## 2024-03-06 RX ORDER — DORZOLAMIDE HYDROCHLORIDE AND TIMOLOL MALEATE 20; 5 MG/ML; MG/ML
SOLUTION/ DROPS OPHTHALMIC
COMMUNITY
Start: 2023-11-22

## 2024-03-06 NOTE — PROGRESS NOTES
Subjective:      Patient ID: Shawn Calvo is a 83 y.o. male.    Chief Complaint: Follow-up      Vitals:    03/06/24 0906   BP: 138/70   Pulse: (!) 51   Temp: 97.6 °F (36.4 °C)   TempSrc: Oral   SpO2: 97%   Weight: 95 kg (209 lb 5.2 oz)   Height: 6' (1.829 m)        HPI   Check up0 of below, here with wife; had ca removed left checkk  leisa Madsen  Goes to [place] for DM  [facility] labs  Cuts grass, fixed truck  Trying to lose wietght, 89  Had teeth pulled    Problem List  Patient Active Problem List   Diagnosis    History of nephrectomy    CKD (chronic kidney disease) stage 2, GFR 60-89 ml/min    Essential hypertension    Bilateral deafness    Hyperlipidemia    Type 2 diabetes mellitus with stage 3 chronic kidney disease, with long-term current use of insulin    Primary malignant neoplasm of left kidney    Cellulitis and abscess of left leg    AK (actinic keratosis)    Encounter for screening for malignant neoplasm of prostate    Stage 3b chronic kidney disease        ALLERGIES:   Review of patient's allergies indicates:   Allergen Reactions    Amlodipine Swelling    Hydralazine analogues Other (See Comments)     headaches    Hydrochlorothiazide Other (See Comments)     RENAL FUNTION         MEDS:   Current Outpatient Medications:     aspirin 81 MG Chew, Take 81 mg by mouth once daily., Disp: , Rfl:     carvediloL (COREG) 25 MG tablet, TAKE 1/2 (ONE-HALF) TABLET BY MOUTH TWICE DAILY WITH MEALS, Disp: 90 tablet, Rfl: 3    CONTOUR TEST STRIPS Strp, USE TO TEST BLOOD GLUCOSE TWICE DAILY, Disp: 100 each, Rfl: 0    dorzolamide-timolol 2-0.5% (COSOPT) 22.3-6.8 mg/mL ophthalmic solution, , Disp: , Rfl:     folic acid (FOLVITE) 1 MG tablet, Take 1 mg by mouth once daily., Disp: , Rfl:     glimepiride (AMARYL) 4 MG tablet, Take 4 mg by mouth daily with breakfast., Disp: , Rfl:     INSULIN NPH HUMAN ISOPHANE (NOVOLIN N SUBQ), Inject 14 Units into the skin every evening., Disp: , Rfl:     latanoprost 0.005 % ophthalmic solution,  Place 1 drop into the right eye every evening., Disp: , Rfl:     lisinopriL (PRINIVIL,ZESTRIL) 40 MG tablet, Take 1 tablet by mouth once daily, Disp: 90 tablet, Rfl: 3    losartan (COZAAR) 100 MG tablet, Take 100 mg by mouth once daily., Disp: , Rfl:     pravastatin (PRAVACHOL) 80 MG tablet, Take 80 mg by mouth once daily., Disp: , Rfl:     selenium 50 mcg Tab, Take 200 mcg by mouth once daily. , Disp: , Rfl:     amoxicillin-clavulanate 875-125mg (AUGMENTIN) 875-125 mg per tablet, Take 1 tablet by mouth 2 (two) times daily. For infection (Patient not taking: Reported on 3/6/2024), Disp: 30 tablet, Rfl: 1      History:  Current Providers as of 3/6/2024  PCP: Vinay Plascencia MD  Care Team Provider: Peter Vizcarra MD  Care Team Provider: Angel Lyn MD  Care Team Provider: Carmen Dominguez LPN  Care Team Provider: Evaristo Courtney MD  Encounter Provider: Vinay Plascencia MD, starting on Wed Mar 6, 2024 12:00 AM  Referring Provider: not found, starting on Wed Mar 6, 2024 12:00 AM  Consulting Physician: Vinay Plascencia MD, starting on Wed Mar 6, 2024  9:04 AM (Active)   Past Medical History:   Diagnosis Date    Chronic renal impairment     Diabetes mellitus, type 2     Hyperlipidemia     Hypertension      Past Surgical History:   Procedure Laterality Date    CATARACT EXTRACTION      CHOLECYSTECTOMY      COLONOSCOPY      2012-- repeat in 10 years    NEPHRECTOMY Right      Social History     Tobacco Use    Smoking status: Former     Passive exposure: Past    Smokeless tobacco: Never   Substance Use Topics    Alcohol use: No         Review of Systems   Constitutional:  Negative for appetite change, fatigue, fever and unexpected weight change.   HENT:  Positive for hearing loss. Negative for congestion, ear pain, sinus pressure and sore throat.    Eyes:  Negative for pain and visual disturbance.   Respiratory:  Negative for shortness of breath.    Cardiovascular:  Negative for chest pain.   Gastrointestinal:   Negative for abdominal pain, constipation and diarrhea.   Endocrine: Negative for polyuria.   Genitourinary:  Negative for difficulty urinating and frequency.   Musculoskeletal:  Negative for arthralgias, back pain and myalgias.   Skin:  Positive for wound. Negative for color change.   Allergic/Immunologic: Negative.    Neurological:  Negative for syncope, weakness and headaches.   Hematological:  Does not bruise/bleed easily.   Psychiatric/Behavioral:  Negative for dysphoric mood and suicidal ideas. The patient is not nervous/anxious.    All other systems reviewed and are negative.    Objective:     Physical Exam  Vitals and nursing note reviewed.   Constitutional:       General: He is not in acute distress.     Appearance: He is well-developed and normal weight. He is not diaphoretic.   HENT:      Head: Normocephalic and atraumatic.      Right Ear: External ear normal.      Left Ear: External ear normal.      Ears:      Comments: [place]     Mouth/Throat:      Pharynx: No oropharyngeal exudate.   Eyes:      General: No scleral icterus.        Right eye: No discharge.         Left eye: No discharge.      Conjunctiva/sclera: Conjunctivae normal.      Pupils: Pupils are equal, round, and reactive to light.   Neck:      Thyroid: No thyromegaly.      Vascular: No JVD.      Trachea: No tracheal deviation.   Cardiovascular:      Rate and Rhythm: Normal rate and regular rhythm.      Pulses:           Dorsalis pedis pulses are 3+ on the right side and 3+ on the left side.        Posterior tibial pulses are 3+ on the right side and 3+ on the left side.      Heart sounds: No murmur heard.     No friction rub. No gallop.   Pulmonary:      Effort: Pulmonary effort is normal. No respiratory distress.      Breath sounds: Normal breath sounds. No stridor. No wheezing or rales.   Chest:      Chest wall: No tenderness.   Abdominal:      General: There is no distension.      Palpations: Abdomen is soft. There is no mass.       Tenderness: There is no abdominal tenderness. There is no guarding or rebound.   Musculoskeletal:         General: No tenderness. Normal range of motion.      Cervical back: Normal range of motion and neck supple.   Feet:      Right foot:      Protective Sensation: 5 sites tested.  5 sites sensed.      Skin integrity: Skin integrity normal.      Toenail Condition: Right toenails are abnormally thick. Fungal disease present.     Left foot:      Protective Sensation: 5 sites tested.  5 sites sensed.      Skin integrity: Skin integrity normal.      Toenail Condition: Left toenails are abnormally thick. Fungal disease present.  Lymphadenopathy:      Cervical: No cervical adenopathy.   Skin:     General: Skin is warm and dry.      Coloration: Skin is not pale.      Findings: No erythema or rash.   Neurological:      General: No focal deficit present.      Mental Status: He is alert and oriented to person, place, and time. Mental status is at baseline.      Cranial Nerves: No cranial nerve deficit.      Motor: No abnormal muscle tone.      Coordination: Coordination normal.      Deep Tendon Reflexes: Reflexes are normal and symmetric. Reflexes normal.   Psychiatric:         Mood and Affect: Mood normal.         Behavior: Behavior normal.         Thought Content: Thought content normal.         Judgment: Judgment normal.             Assessment:     1. Stage 3b chronic kidney disease    2. Type 2 diabetes mellitus with stage 3a chronic kidney disease, with long-term current use of insulin    3. Primary malignant neoplasm of left kidney    4. Essential hypertension    5. Bilateral deafness    6. History of nephrectomy      Plan:        Medication List            Accurate as of March 6, 2024  9:51 AM. If you have any questions, ask your nurse or doctor.                CONTINUE taking these medications      amoxicillin-clavulanate 875-125mg 875-125 mg per tablet  Commonly known as: AUGMENTIN  Take 1 tablet by mouth 2 (two)  times daily. For infection     aspirin 81 MG Chew     carvediloL 25 MG tablet  Commonly known as: COREG  TAKE 1/2 (ONE-HALF) TABLET BY MOUTH TWICE DAILY WITH MEALS     CONTOUR TEST STRIPS Strp  Generic drug: blood sugar diagnostic  USE TO TEST BLOOD GLUCOSE TWICE DAILY     dorzolamide-timolol 2-0.5% 22.3-6.8 mg/mL ophthalmic solution  Commonly known as: COSOPT     folic acid 1 MG tablet  Commonly known as: FOLVITE     glimepiride 4 MG tablet  Commonly known as: AMARYL     latanoprost 0.005 % ophthalmic solution     lisinopriL 40 MG tablet  Commonly known as: PRINIVIL,ZESTRIL  Take 1 tablet by mouth once daily     losartan 100 MG tablet  Commonly known as: COZAAR     NOVOLIN N SUBQ     pravastatin 80 MG tablet  Commonly known as: PRAVACHOL     selenium 50 mcg Tab            Stage 3b chronic kidney disease  -     Comprehensive Metabolic Panel; Future; Expected date: 03/06/2024  -     Hemoglobin A1C; Future  -     Microalbumin/Creatinine Ratio, Urine; Future    Type 2 diabetes mellitus with stage 3a chronic kidney disease, with long-term current use of insulin  -     Comprehensive Metabolic Panel; Future; Expected date: 03/06/2024  -     Hemoglobin A1C; Future  -     Microalbumin/Creatinine Ratio, Urine; Future    Primary malignant neoplasm of left kidney  -     Comprehensive Metabolic Panel; Future; Expected date: 03/06/2024  -     Hemoglobin A1C; Future  -     Microalbumin/Creatinine Ratio, Urine; Future    Essential hypertension    Bilateral deafness    History of nephrectomy

## 2024-03-11 ENCOUNTER — LAB VISIT (OUTPATIENT)
Dept: LAB | Facility: HOSPITAL | Age: 84
End: 2024-03-11
Attending: FAMILY MEDICINE
Payer: MEDICARE

## 2024-03-11 DIAGNOSIS — Z79.4 TYPE 2 DIABETES MELLITUS WITH STAGE 3A CHRONIC KIDNEY DISEASE, WITH LONG-TERM CURRENT USE OF INSULIN: ICD-10-CM

## 2024-03-11 DIAGNOSIS — C64.2 PRIMARY MALIGNANT NEOPLASM OF LEFT KIDNEY: ICD-10-CM

## 2024-03-11 DIAGNOSIS — N18.32 STAGE 3B CHRONIC KIDNEY DISEASE: ICD-10-CM

## 2024-03-11 DIAGNOSIS — E11.22 TYPE 2 DIABETES MELLITUS WITH STAGE 3A CHRONIC KIDNEY DISEASE, WITH LONG-TERM CURRENT USE OF INSULIN: ICD-10-CM

## 2024-03-11 DIAGNOSIS — N18.31 TYPE 2 DIABETES MELLITUS WITH STAGE 3A CHRONIC KIDNEY DISEASE, WITH LONG-TERM CURRENT USE OF INSULIN: ICD-10-CM

## 2024-03-11 LAB
ALBUMIN SERPL BCP-MCNC: 4 G/DL (ref 3.5–5.2)
ALBUMIN/CREAT UR: 348.2 UG/MG (ref 0–30)
ALP SERPL-CCNC: 64 U/L (ref 38–126)
ALT SERPL W/O P-5'-P-CCNC: 15 U/L (ref 10–44)
ANION GAP SERPL CALC-SCNC: 8 MMOL/L (ref 8–16)
AST SERPL-CCNC: 22 U/L (ref 15–46)
BILIRUB SERPL-MCNC: 1.7 MG/DL (ref 0.1–1)
CALCIUM SERPL-MCNC: 9.7 MG/DL (ref 8.7–10.5)
CHLORIDE SERPL-SCNC: 107 MMOL/L (ref 95–110)
CO2 SERPL-SCNC: 28 MMOL/L (ref 23–29)
CREAT SERPL-MCNC: 1.64 MG/DL (ref 0.5–1.4)
CREAT UR-MCNC: 83 MG/DL (ref 23–375)
EST. GFR  (NO RACE VARIABLE): 41 ML/MIN/1.73 M^2
ESTIMATED AVG GLUCOSE: 143 MG/DL (ref 68–131)
GLUCOSE SERPL-MCNC: 93 MG/DL (ref 70–110)
HBA1C MFR BLD: 6.6 % (ref 4–5.6)
MICROALBUMIN UR DL<=1MG/L-MCNC: 289 UG/ML
POTASSIUM SERPL-SCNC: 4.5 MMOL/L (ref 3.5–5.1)
PROT SERPL-MCNC: 7.4 G/DL (ref 6–8.4)
SODIUM SERPL-SCNC: 143 MMOL/L (ref 136–145)
UUN UR-MCNC: 20 MG/DL (ref 2–20)

## 2024-03-11 PROCEDURE — 80053 COMPREHEN METABOLIC PANEL: CPT | Mod: PN | Performed by: FAMILY MEDICINE

## 2024-03-11 PROCEDURE — 83036 HEMOGLOBIN GLYCOSYLATED A1C: CPT | Performed by: FAMILY MEDICINE

## 2024-03-11 PROCEDURE — 36415 COLL VENOUS BLD VENIPUNCTURE: CPT | Mod: PN | Performed by: FAMILY MEDICINE

## 2024-03-11 PROCEDURE — 82043 UR ALBUMIN QUANTITATIVE: CPT | Mod: PN | Performed by: FAMILY MEDICINE

## 2024-03-19 ENCOUNTER — TELEPHONE (OUTPATIENT)
Dept: FAMILY MEDICINE | Facility: CLINIC | Age: 84
End: 2024-03-19
Payer: MEDICARE

## 2024-03-19 DIAGNOSIS — N18.31 TYPE 2 DIABETES MELLITUS WITH STAGE 3A CHRONIC KIDNEY DISEASE, WITH LONG-TERM CURRENT USE OF INSULIN: ICD-10-CM

## 2024-03-19 DIAGNOSIS — I10 ESSENTIAL HYPERTENSION: ICD-10-CM

## 2024-03-19 DIAGNOSIS — Z12.5 ENCOUNTER FOR SCREENING FOR MALIGNANT NEOPLASM OF PROSTATE: ICD-10-CM

## 2024-03-19 DIAGNOSIS — Z00.00 ANNUAL PHYSICAL EXAM: Primary | ICD-10-CM

## 2024-03-19 DIAGNOSIS — E11.22 TYPE 2 DIABETES MELLITUS WITH STAGE 3A CHRONIC KIDNEY DISEASE, WITH LONG-TERM CURRENT USE OF INSULIN: ICD-10-CM

## 2024-03-19 DIAGNOSIS — Z79.4 TYPE 2 DIABETES MELLITUS WITH STAGE 3A CHRONIC KIDNEY DISEASE, WITH LONG-TERM CURRENT USE OF INSULIN: ICD-10-CM

## 2024-03-19 DIAGNOSIS — N18.32 STAGE 3B CHRONIC KIDNEY DISEASE: ICD-10-CM

## 2024-03-19 NOTE — TELEPHONE ENCOUNTER
Pt is scheduled to see you in September. Please place applicable lab orders to have drawn prior to appt with you.     *Return to staff so that orders can be linked to appt. Thanks

## 2024-09-16 ENCOUNTER — LAB VISIT (OUTPATIENT)
Dept: LAB | Facility: HOSPITAL | Age: 84
End: 2024-09-16
Attending: FAMILY MEDICINE
Payer: MEDICARE

## 2024-09-16 DIAGNOSIS — Z79.4 TYPE 2 DIABETES MELLITUS WITH STAGE 3A CHRONIC KIDNEY DISEASE, WITH LONG-TERM CURRENT USE OF INSULIN: ICD-10-CM

## 2024-09-16 DIAGNOSIS — N18.32 STAGE 3B CHRONIC KIDNEY DISEASE: ICD-10-CM

## 2024-09-16 DIAGNOSIS — E11.22 TYPE 2 DIABETES MELLITUS WITH STAGE 3A CHRONIC KIDNEY DISEASE, WITH LONG-TERM CURRENT USE OF INSULIN: ICD-10-CM

## 2024-09-16 DIAGNOSIS — Z12.5 ENCOUNTER FOR SCREENING FOR MALIGNANT NEOPLASM OF PROSTATE: ICD-10-CM

## 2024-09-16 DIAGNOSIS — N18.31 TYPE 2 DIABETES MELLITUS WITH STAGE 3A CHRONIC KIDNEY DISEASE, WITH LONG-TERM CURRENT USE OF INSULIN: ICD-10-CM

## 2024-09-16 DIAGNOSIS — I10 ESSENTIAL HYPERTENSION: ICD-10-CM

## 2024-09-16 DIAGNOSIS — Z00.00 ANNUAL PHYSICAL EXAM: ICD-10-CM

## 2024-09-16 LAB
ALBUMIN SERPL BCP-MCNC: 4.5 G/DL (ref 3.5–5.2)
ALP SERPL-CCNC: 71 U/L (ref 38–126)
ALT SERPL W/O P-5'-P-CCNC: 26 U/L (ref 10–44)
ANION GAP SERPL CALC-SCNC: 11 MMOL/L (ref 8–16)
AST SERPL-CCNC: 31 U/L (ref 15–46)
BASOPHILS # BLD AUTO: 0.06 K/UL (ref 0–0.2)
BASOPHILS NFR BLD: 0.9 % (ref 0–1.9)
BILIRUB SERPL-MCNC: 2.8 MG/DL (ref 0.1–1)
CALCIUM SERPL-MCNC: 9.5 MG/DL (ref 8.7–10.5)
CHLORIDE SERPL-SCNC: 106 MMOL/L (ref 95–110)
CHOLEST SERPL-MCNC: 152 MG/DL (ref 120–199)
CHOLEST/HDLC SERPL: 3.9 {RATIO} (ref 2–5)
CO2 SERPL-SCNC: 23 MMOL/L (ref 23–29)
COMPLEXED PSA SERPL-MCNC: 3.9 NG/ML (ref 0–4)
CREAT SERPL-MCNC: 1.6 MG/DL (ref 0.5–1.4)
DIFFERENTIAL METHOD BLD: ABNORMAL
EOSINOPHIL # BLD AUTO: 0.2 K/UL (ref 0–0.5)
EOSINOPHIL NFR BLD: 2.3 % (ref 0–8)
ERYTHROCYTE [DISTWIDTH] IN BLOOD BY AUTOMATED COUNT: 12.4 % (ref 11.5–14.5)
EST. GFR  (NO RACE VARIABLE): 42.2 ML/MIN/1.73 M^2
ESTIMATED AVG GLUCOSE: 126 MG/DL (ref 68–131)
GLUCOSE SERPL-MCNC: 139 MG/DL (ref 70–110)
HBA1C MFR BLD: 6 % (ref 4–5.6)
HCT VFR BLD AUTO: 43.3 % (ref 40–54)
HDLC SERPL-MCNC: 39 MG/DL (ref 40–75)
HDLC SERPL: 25.7 % (ref 20–50)
HGB BLD-MCNC: 14.2 G/DL (ref 14–18)
IMM GRANULOCYTES # BLD AUTO: 0.03 K/UL (ref 0–0.04)
IMM GRANULOCYTES NFR BLD AUTO: 0.5 % (ref 0–0.5)
LDLC SERPL CALC-MCNC: 94.6 MG/DL (ref 63–159)
LYMPHOCYTES # BLD AUTO: 1.4 K/UL (ref 1–4.8)
LYMPHOCYTES NFR BLD: 20.5 % (ref 18–48)
MCH RBC QN AUTO: 32 PG (ref 27–31)
MCHC RBC AUTO-ENTMCNC: 32.8 G/DL (ref 32–36)
MCV RBC AUTO: 98 FL (ref 82–98)
MONOCYTES # BLD AUTO: 0.4 K/UL (ref 0.3–1)
MONOCYTES NFR BLD: 6.6 % (ref 4–15)
NEUTROPHILS # BLD AUTO: 4.6 K/UL (ref 1.8–7.7)
NEUTROPHILS NFR BLD: 69.2 % (ref 38–73)
NONHDLC SERPL-MCNC: 113 MG/DL
NRBC BLD-RTO: 0 /100 WBC
PLATELET # BLD AUTO: 155 K/UL (ref 150–450)
PMV BLD AUTO: 11.5 FL (ref 9.2–12.9)
POTASSIUM SERPL-SCNC: 4.8 MMOL/L (ref 3.5–5.1)
PROT SERPL-MCNC: 7.6 G/DL (ref 6–8.4)
RBC # BLD AUTO: 4.44 M/UL (ref 4.6–6.2)
SODIUM SERPL-SCNC: 140 MMOL/L (ref 136–145)
TRIGL SERPL-MCNC: 92 MG/DL (ref 30–150)
TSH SERPL DL<=0.005 MIU/L-ACNC: 1.91 UIU/ML (ref 0.4–4)
UUN UR-MCNC: 17 MG/DL (ref 2–20)
WBC # BLD AUTO: 6.64 K/UL (ref 3.9–12.7)

## 2024-09-16 PROCEDURE — 84443 ASSAY THYROID STIM HORMONE: CPT | Mod: PN | Performed by: FAMILY MEDICINE

## 2024-09-16 PROCEDURE — 80061 LIPID PANEL: CPT | Performed by: FAMILY MEDICINE

## 2024-09-16 PROCEDURE — 84153 ASSAY OF PSA TOTAL: CPT | Performed by: FAMILY MEDICINE

## 2024-09-16 PROCEDURE — 36415 COLL VENOUS BLD VENIPUNCTURE: CPT | Mod: PN | Performed by: FAMILY MEDICINE

## 2024-09-16 PROCEDURE — 83036 HEMOGLOBIN GLYCOSYLATED A1C: CPT | Performed by: FAMILY MEDICINE

## 2024-09-16 PROCEDURE — 80053 COMPREHEN METABOLIC PANEL: CPT | Mod: PN | Performed by: FAMILY MEDICINE

## 2024-09-16 PROCEDURE — 85025 COMPLETE CBC W/AUTO DIFF WBC: CPT | Mod: PN | Performed by: FAMILY MEDICINE

## 2024-09-19 ENCOUNTER — TELEPHONE (OUTPATIENT)
Dept: FAMILY MEDICINE | Facility: CLINIC | Age: 84
End: 2024-09-19

## 2024-09-19 ENCOUNTER — OFFICE VISIT (OUTPATIENT)
Dept: FAMILY MEDICINE | Facility: CLINIC | Age: 84
End: 2024-09-19
Payer: MEDICARE

## 2024-09-19 VITALS
HEIGHT: 72 IN | SYSTOLIC BLOOD PRESSURE: 118 MMHG | OXYGEN SATURATION: 97 % | HEART RATE: 58 BPM | WEIGHT: 205.81 LBS | BODY MASS INDEX: 27.87 KG/M2 | TEMPERATURE: 98 F | DIASTOLIC BLOOD PRESSURE: 68 MMHG

## 2024-09-19 DIAGNOSIS — E11.22 TYPE 2 DIABETES MELLITUS WITH STAGE 3A CHRONIC KIDNEY DISEASE, WITH LONG-TERM CURRENT USE OF INSULIN: ICD-10-CM

## 2024-09-19 DIAGNOSIS — E78.5 HYPERLIPIDEMIA, UNSPECIFIED HYPERLIPIDEMIA TYPE: ICD-10-CM

## 2024-09-19 DIAGNOSIS — Z23 NEED FOR INFLUENZA VACCINATION: Primary | ICD-10-CM

## 2024-09-19 DIAGNOSIS — N18.31 TYPE 2 DIABETES MELLITUS WITH STAGE 3A CHRONIC KIDNEY DISEASE, WITH LONG-TERM CURRENT USE OF INSULIN: ICD-10-CM

## 2024-09-19 DIAGNOSIS — Z79.4 TYPE 2 DIABETES MELLITUS WITH STAGE 3A CHRONIC KIDNEY DISEASE, WITH LONG-TERM CURRENT USE OF INSULIN: ICD-10-CM

## 2024-09-19 DIAGNOSIS — Z90.5 HISTORY OF NEPHRECTOMY: ICD-10-CM

## 2024-09-19 DIAGNOSIS — I10 ESSENTIAL HYPERTENSION: ICD-10-CM

## 2024-09-19 DIAGNOSIS — N18.32 STAGE 3B CHRONIC KIDNEY DISEASE: Primary | ICD-10-CM

## 2024-09-19 DIAGNOSIS — N18.32 STAGE 3B CHRONIC KIDNEY DISEASE: ICD-10-CM

## 2024-09-19 DIAGNOSIS — H91.93 BILATERAL DEAFNESS: ICD-10-CM

## 2024-09-19 PROCEDURE — 99214 OFFICE O/P EST MOD 30 MIN: CPT | Mod: S$GLB,,, | Performed by: FAMILY MEDICINE

## 2024-09-19 PROCEDURE — G0008 ADMIN INFLUENZA VIRUS VAC: HCPCS | Mod: S$GLB,,, | Performed by: FAMILY MEDICINE

## 2024-09-19 PROCEDURE — 90653 IIV ADJUVANT VACCINE IM: CPT | Mod: S$GLB,,, | Performed by: FAMILY MEDICINE

## 2024-09-19 RX ORDER — SILDENAFIL 25 MG/1
25 TABLET, FILM COATED ORAL DAILY PRN
Qty: 10 TABLET | Refills: 0 | Status: SHIPPED | OUTPATIENT
Start: 2024-09-19 | End: 2025-09-19

## 2024-09-19 RX ORDER — AMLODIPINE BESYLATE 2.5 MG/1
2.5 TABLET ORAL NIGHTLY
COMMUNITY

## 2024-09-19 NOTE — PROGRESS NOTES
Subjective:      Patient ID: Shawn Calvo is a 83 y.o. male.    Chief Complaint: Follow-up (6 mon)      Vitals:    09/19/24 1003   BP: 118/68   Pulse: (!) 58   Temp: 98 °F (36.7 °C)   TempSrc: Oral   SpO2: 97%   Weight: 93.3 kg (205 lb 12.8 oz)   Height: 6' (1.829 m)        HPI   Check up of DM, CKD3b, here withwife; both [facility]; had labs; creatinine stable; A1c goood; stilltrying to lose more weight  Wants viagra for PRN zach BP  [facility] now  Had skin excision with Dr Felix  Has glaucoma, sl diminished vision right eye, goes to "[facility]      Problem List  Patient Active Problem List   Diagnosis    History of nephrectomy    CKD (chronic kidney disease) stage 2, GFR 60-89 ml/min    Essential hypertension    Bilateral deafness    Hyperlipidemia    Type 2 diabetes mellitus with stage 3 chronic kidney disease, with long-term current use of insulin    Primary malignant neoplasm of left kidney    Cellulitis and abscess of left leg    AK (actinic keratosis)    Encounter for screening for malignant neoplasm of prostate    Stage 3b chronic kidney disease        ALLERGIES:   Review of patient's allergies indicates:   Allergen Reactions    Amlodipine Swelling    Hydralazine analogues Other (See Comments)     headaches    Hydrochlorothiazide Other (See Comments)     RENAL FUNTION         MEDS:   Current Outpatient Medications:     amLODIPine (NORVASC) 2.5 MG tablet, Take 2.5 mg by mouth every evening., Disp: , Rfl:     aspirin 81 MG Chew, Take 81 mg by mouth once daily., Disp: , Rfl:     carvediloL (COREG) 25 MG tablet, TAKE 1/2 (ONE-HALF) TABLET BY MOUTH TWICE DAILY WITH MEALS, Disp: 90 tablet, Rfl: 3    CONTOUR TEST STRIPS Strp, USE TO TEST BLOOD GLUCOSE TWICE DAILY, Disp: 100 each, Rfl: 0    dorzolamide-timolol 2-0.5% (COSOPT) 22.3-6.8 mg/mL ophthalmic solution, , Disp: , Rfl:     folic acid (FOLVITE) 1 MG tablet, Take 1 mg by mouth once daily., Disp: , Rfl:     glimepiride (AMARYL) 4 MG tablet, Take 4 mg by mouth  daily with breakfast., Disp: , Rfl:     INSULIN NPH HUMAN ISOPHANE (NOVOLIN N SUBQ), Inject 14 Units into the skin every evening., Disp: , Rfl:     latanoprost 0.005 % ophthalmic solution, Place 1 drop into the right eye every evening., Disp: , Rfl:     lisinopriL (PRINIVIL,ZESTRIL) 40 MG tablet, Take 1 tablet by mouth once daily, Disp: 90 tablet, Rfl: 3    losartan (COZAAR) 100 MG tablet, Take 100 mg by mouth once daily., Disp: , Rfl:     pravastatin (PRAVACHOL) 80 MG tablet, Take 80 mg by mouth once daily., Disp: , Rfl:     selenium 50 mcg Tab, Take 200 mcg by mouth once daily. , Disp: , Rfl:     sildenafiL (VIAGRA) 25 MG tablet, Take 1 tablet (25 mg total) by mouth daily as needed., Disp: 10 tablet, Rfl: 0      History:  Current Providers as of 9/19/2024  PCP: Vinay Plascencia MD  Care Team Provider: Peter Vizcarra MD  Care Team Provider: Angel Lyn MD  Care Team Provider: Carmen Dominguez LPN  Care Team Provider: [facility] MD  Care Team Provider: Raciel Madsen MD  Encounter Provider: Vinay Plascencia MD, starting on Thu Sep 19, 2024 12:00 AM  Referring Provider: not found, starting on Thu Sep 19, 2024 12:00 AM  Consulting Physician: Vinay Plascencia MD, starting on Wed Mar 6, 2024  9:50 AM (Active)   Past Medical History:   Diagnosis Date    Chronic renal impairment     Diabetes mellitus, type 2     Hyperlipidemia     Hypertension      Past Surgical History:   Procedure Laterality Date    CATARACT EXTRACTION      CHOLECYSTECTOMY      COLONOSCOPY      2012-- repeat in 10 years    NEPHRECTOMY Right      Social History     Tobacco Use    Smoking status: Former     Passive exposure: Past    Smokeless tobacco: Never   Substance Use Topics    Alcohol use: No         Review of Systems   Constitutional:  Negative for appetite change, fatigue, fever and unexpected weight change.   HENT:  Positive for hearing loss. Negative for congestion, ear pain, sinus pressure and sore throat.    Eyes:  Negative  for pain and visual disturbance.   Respiratory:  Negative for shortness of breath.    Cardiovascular:  Negative for chest pain.   Gastrointestinal:  Negative for abdominal pain, constipation and diarrhea.   Endocrine: Negative for polyuria.   Genitourinary:  Negative for difficulty urinating and frequency.   Musculoskeletal:  Negative for arthralgias, back pain and myalgias.   Skin:  Negative for color change.   Allergic/Immunologic: Negative.    Neurological:  Negative for syncope, weakness and headaches.   Hematological:  Bruises/bleeds easily.   Psychiatric/Behavioral:  Negative for dysphoric mood and suicidal ideas. The patient is not nervous/anxious.    All other systems reviewed and are negative.    Objective:     Physical Exam  Vitals and nursing note reviewed.   Constitutional:       General: He is not in acute distress.     Appearance: He is well-developed. He is not diaphoretic.   HENT:      Head: Normocephalic and atraumatic.      Right Ear: External ear normal.      Left Ear: External ear normal.      Mouth/Throat:      Pharynx: No oropharyngeal exudate.   Eyes:      General: No scleral icterus.        Right eye: No discharge.         Left eye: No discharge.      Conjunctiva/sclera: Conjunctivae normal.      Pupils: Pupils are equal, round, and reactive to light.   Neck:      Thyroid: No thyromegaly.      Vascular: No JVD.      Trachea: No tracheal deviation.   Cardiovascular:      Rate and Rhythm: Normal rate and regular rhythm.      Heart sounds: No murmur heard.     No friction rub. No gallop.   Pulmonary:      Effort: Pulmonary effort is normal. No respiratory distress.      Breath sounds: Normal breath sounds. No stridor. No wheezing or rales.   Chest:      Chest wall: No tenderness.   Abdominal:      General: There is no distension.      Palpations: Abdomen is soft. There is no mass.      Tenderness: There is no abdominal tenderness. There is no guarding or rebound.   Musculoskeletal:          General: No tenderness. Normal range of motion.      Cervical back: Normal range of motion and neck supple.   Lymphadenopathy:      Cervical: No cervical adenopathy.   Skin:     General: Skin is warm and dry.      Coloration: Skin is not pale.      Findings: No erythema or rash.   Neurological:      General: No focal deficit present.      Mental Status: He is alert and oriented to person, place, and time. Mental status is at baseline.      Cranial Nerves: No cranial nerve deficit.      Motor: No abnormal muscle tone.      Coordination: Coordination normal.      Deep Tendon Reflexes: Reflexes are normal and symmetric. Reflexes normal.   Psychiatric:         Mood and Affect: Mood normal.         Behavior: Behavior normal.         Thought Content: Thought content normal.         Judgment: Judgment normal.             Assessment:     1. Need for influenza vaccination    2. Bilateral deafness    3. Essential hypertension    4. Hyperlipidemia, unspecified hyperlipidemia type    5. History of nephrectomy    6. Stage 3b chronic kidney disease      Plan:        Medication List            Accurate as of September 19, 2024 11:59 PM. If you have any questions, ask your nurse or doctor.                START taking these medications      sildenafiL 25 MG tablet  Commonly known as: VIAGRA  Take 1 tablet (25 mg total) by mouth daily as needed.  Started by: Vinay Plascencia MD            CONTINUE taking these medications      amLODIPine 2.5 MG tablet  Commonly known as: NORVASC     aspirin 81 MG Chew     carvediloL 25 MG tablet  Commonly known as: COREG  TAKE 1/2 (ONE-HALF) TABLET BY MOUTH TWICE DAILY WITH MEALS     CONTOUR TEST STRIPS Strp  Generic drug: blood sugar diagnostic  USE TO TEST BLOOD GLUCOSE TWICE DAILY     dorzolamide-timolol 2-0.5% 22.3-6.8 mg/mL ophthalmic solution  Commonly known as: COSOPT     folic acid 1 MG tablet  Commonly known as: FOLVITE     glimepiride 4 MG tablet  Commonly known as: AMARYL     latanoprost  0.005 % ophthalmic solution     lisinopriL 40 MG tablet  Commonly known as: PRINIVIL,ZESTRIL  Take 1 tablet by mouth once daily     losartan 100 MG tablet  Commonly known as: COZAAR     NOVOLIN N SUBQ     pravastatin 80 MG tablet  Commonly known as: PRAVACHOL     selenium 50 mcg Tab               Where to Get Your Medications        Information about where to get these medications is not yet available    Ask your nurse or doctor about these medications  sildenafiL 25 MG tablet       Need for influenza vaccination  -     influenza (adjuvanted) (Fluad) 45 mcg/0.5 mL IM vaccine (> or = 64 yo) 0.5 mL    Bilateral deafness    Essential hypertension    Hyperlipidemia, unspecified hyperlipidemia type    History of nephrectomy    Stage 3b chronic kidney disease    Other orders  -     sildenafiL (VIAGRA) 25 MG tablet; Take 1 tablet (25 mg total) by mouth daily as needed.  Dispense: 10 tablet; Refill: 0

## 2024-10-25 ENCOUNTER — TELEPHONE (OUTPATIENT)
Dept: FAMILY MEDICINE | Facility: CLINIC | Age: 84
End: 2024-10-25
Payer: MEDICARE

## 2024-10-25 ENCOUNTER — HOSPITAL ENCOUNTER (EMERGENCY)
Facility: HOSPITAL | Age: 84
Discharge: HOME | End: 2024-10-25
Attending: FAMILY MEDICINE
Payer: MEDICARE

## 2024-10-25 VITALS
HEIGHT: 73 IN | BODY MASS INDEX: 25.45 KG/M2 | WEIGHT: 192 LBS | DIASTOLIC BLOOD PRESSURE: 81 MMHG | HEART RATE: 60 BPM | RESPIRATION RATE: 20 BRPM | SYSTOLIC BLOOD PRESSURE: 181 MMHG | TEMPERATURE: 98 F | OXYGEN SATURATION: 99 %

## 2024-10-25 DIAGNOSIS — S61.311A LACERATION OF LEFT INDEX FINGER WITHOUT FOREIGN BODY WITH DAMAGE TO NAIL, INITIAL ENCOUNTER: Primary | ICD-10-CM

## 2024-10-25 DIAGNOSIS — S61.315A LACERATION OF LEFT RING FINGER WITHOUT FOREIGN BODY WITH DAMAGE TO NAIL, INITIAL ENCOUNTER: ICD-10-CM

## 2024-10-25 PROCEDURE — 90471 IMMUNIZATION ADMIN: CPT | Mod: ER | Performed by: PHYSICIAN ASSISTANT

## 2024-10-25 PROCEDURE — 12002 RPR S/N/AX/GEN/TRNK2.6-7.5CM: CPT | Mod: ER

## 2024-10-25 PROCEDURE — 63600175 PHARM REV CODE 636 W HCPCS: Mod: ER | Performed by: PHYSICIAN ASSISTANT

## 2024-10-25 PROCEDURE — 90715 TDAP VACCINE 7 YRS/> IM: CPT | Mod: ER | Performed by: PHYSICIAN ASSISTANT

## 2024-10-25 PROCEDURE — 99284 EMERGENCY DEPT VISIT MOD MDM: CPT | Mod: 25,ER

## 2024-10-25 PROCEDURE — 25000003 PHARM REV CODE 250: Mod: ER | Performed by: PHYSICIAN ASSISTANT

## 2024-10-25 RX ORDER — CEPHALEXIN 500 MG/1
500 CAPSULE ORAL
Status: COMPLETED | OUTPATIENT
Start: 2024-10-25 | End: 2024-10-25

## 2024-10-25 RX ORDER — CEPHALEXIN 500 MG/1
500 CAPSULE ORAL EVERY 6 HOURS
Qty: 28 CAPSULE | Refills: 0 | Status: SHIPPED | OUTPATIENT
Start: 2024-10-25 | End: 2024-11-01

## 2024-10-25 RX ORDER — LIDOCAINE HYDROCHLORIDE 10 MG/ML
10 INJECTION, SOLUTION EPIDURAL; INFILTRATION; INTRACAUDAL; PERINEURAL
Status: COMPLETED | OUTPATIENT
Start: 2024-10-25 | End: 2024-10-25

## 2024-10-25 RX ADMIN — TETANUS TOXOID, REDUCED DIPHTHERIA TOXOID AND ACELLULAR PERTUSSIS VACCINE, ADSORBED 0.5 ML: 5; 2.5; 8; 8; 2.5 SUSPENSION INTRAMUSCULAR at 11:10

## 2024-10-25 RX ADMIN — CEPHALEXIN 500 MG: 500 CAPSULE ORAL at 01:10

## 2024-10-25 RX ADMIN — LIDOCAINE HYDROCHLORIDE 100 MG: 10 INJECTION, SOLUTION EPIDURAL; INFILTRATION; INTRACAUDAL at 11:10

## 2024-10-25 NOTE — TELEPHONE ENCOUNTER
FYI: Pt and wife walked in for assistance. Pt cut multiple fingers on a table saw a few minutes ago. Pt referred to ER for evaluation.

## 2024-11-11 NOTE — PROGRESS NOTES
Chief Complaint: Evaluation of New Hand Pain    SUBJECTIVE:     Mr. Calvo is an 84-year-old male who presents for follow up evaluation and management of left digit lacerations.    The injury occurred on 10/25/2024 when he lacerated his left ring and index digits with a table saw.  He presented to the ED were wounds were irrigated +sutured and he was provided tetanus and antibiotic prophylaxis.    Patient has been cleaning his wounds diligently over the past few weeks and allowing for healing.  He is not complaining of any ongoing pain, wound drainage, wound tenderness or difficulties with digit range of motion.  He denies any discomfort or movement to his nail.    Review of patient's allergies indicates:   Allergen Reactions    Amlodipine Swelling    Hydralazine analogues Other (See Comments)     headaches    Hydrochlorothiazide Other (See Comments)     RENAL FUNTION           Current Outpatient Medications   Medication Sig Dispense Refill    amLODIPine (NORVASC) 2.5 MG tablet Take 2.5 mg by mouth every evening.      aspirin 81 MG Chew Take 81 mg by mouth once daily.      carvediloL (COREG) 25 MG tablet TAKE 1/2 (ONE-HALF) TABLET BY MOUTH TWICE DAILY WITH MEALS 90 tablet 3    CONTOUR TEST STRIPS Strp USE TO TEST BLOOD GLUCOSE TWICE DAILY 100 each 0    dorzolamide-timolol 2-0.5% (COSOPT) 22.3-6.8 mg/mL ophthalmic solution       folic acid (FOLVITE) 1 MG tablet Take 1 mg by mouth once daily.      glimepiride (AMARYL) 4 MG tablet Take 4 mg by mouth daily with breakfast.      INSULIN NPH HUMAN ISOPHANE (NOVOLIN N SUBQ) Inject 14 Units into the skin every evening.      latanoprost 0.005 % ophthalmic solution Place 1 drop into the right eye every evening.      lisinopriL (PRINIVIL,ZESTRIL) 40 MG tablet Take 1 tablet by mouth once daily 90 tablet 3    losartan (COZAAR) 100 MG tablet Take 100 mg by mouth once daily.      pravastatin (PRAVACHOL) 80 MG tablet Take 80 mg by mouth once daily.      selenium 50 mcg Tab Take 200  mcg by mouth once daily.       sildenafiL (VIAGRA) 25 MG tablet Take 1 tablet (25 mg total) by mouth daily as needed. 10 tablet 0     No current facility-administered medications for this visit.       Past Medical History:   Diagnosis Date    Chronic renal impairment     Diabetes mellitus, type 2     Hyperlipidemia     Hypertension        Past Surgical History:   Procedure Laterality Date    CATARACT EXTRACTION      CHOLECYSTECTOMY      COLONOSCOPY      2012-- repeat in 10 years    NEPHRECTOMY Right        Review of Systems:  Review of Systems   Constitutional:  Negative for chills, fever and weight loss.   HENT:  Negative for congestion, ear pain and nosebleeds.    Eyes:  Negative for blurred vision, double vision and photophobia.   Respiratory:  Negative for sputum production and shortness of breath.    Cardiovascular:  Negative for chest pain, palpitations and orthopnea.   Gastrointestinal:  Negative for abdominal pain, nausea and vomiting.   Musculoskeletal:  Negative for falls.  Negative for joint pain.  Negative for myalgias.   Skin:  Negative for rash.   Neurological:  Negative for tingling, sensory change and focal weakness.    OBJECTIVE:   Vital Signs (Most Recent)  There were no vitals filed for this visit.    PHYSICAL EXAM:  General  General: alert & oriented x 3, NAD, sitting comfortably in the exam room  Resp: breathing unlabored  GI: abdomen soft and nondistended  Psych: mood and affect appropriate  HEENT: PERRLA    Examination left HAND/WRIST:   Skin:  Distal fingertip lacerations to index and ring finger are healing nicely without evidence of infection including erythema, purulence, warmth or tenderness.  Prolene sutures intact.  Palpation:   Mild tenderness to affected fingertips.  Otherwise nontender throughout.  Otherwise, NTTP to bony prominences and soft tissues throughout.   ROM (active and passive):  Full to WRIST flex/ext/radial and ulnar deviation.  Full to DIGIT MCP/PIP/DIP digits without  pain or difficulty   Sensation: grossly intact to radial/median/ulnar nerve distributions  Special Testing: Tinels negative. Finkelstein negative. Thumb CMC grind (-)  NV: Pulses palpable. Cap refill brisk    Imaging:  Three-view x-ray left hand obtained in the ED showed irregularity to soft tissue and bony abnormality involving the tips of distal phalanx 2 and 4.    ASSESSMENT/PLAN:     1. Laceration of left ring finger without foreign body, nail damage status unspecified, initial encounter        2. Laceration of left index finger without foreign body with damage to nail, initial encounter  Ambulatory referral/consult to Orthopedics      3. Laceration of left ring finger without foreign body with damage to nail, initial encounter  Ambulatory referral/consult to Orthopedics        Discussion:  Patient has done a very good job keeping his wounds clean and allowing them to heal appropriately.  Sutures were removed at the bedside without complication.  No signs or symptoms of active infection.  His wounds should continue to heal uneventfully and his digit function should remain unaffected.  Activity:  Increase as tolerated  Pain control:  Patient not in any ongoing pain.  He can continue over-the-counter analgesics if pain arises.  Follow Up:  4 weeks    This note was generated with the assistance of ambient listening technology. Verbal consent was obtained by the patient and accompanying visitor(s) for the recording of patient appointment to facilitate this note. I attest to having reviewed and edited the generated note for accuracy, though some syntax or spelling errors may persist. Please contact the author of this note for any clarification.

## 2024-11-12 ENCOUNTER — OFFICE VISIT (OUTPATIENT)
Dept: ORTHOPEDICS | Facility: CLINIC | Age: 84
End: 2024-11-12
Payer: MEDICARE

## 2024-11-12 DIAGNOSIS — S61.215A LACERATION OF LEFT RING FINGER WITHOUT FOREIGN BODY, NAIL DAMAGE STATUS UNSPECIFIED, INITIAL ENCOUNTER: Primary | ICD-10-CM

## 2024-11-12 DIAGNOSIS — S61.315A LACERATION OF LEFT RING FINGER WITHOUT FOREIGN BODY WITH DAMAGE TO NAIL, INITIAL ENCOUNTER: ICD-10-CM

## 2024-11-12 DIAGNOSIS — S61.311A LACERATION OF LEFT INDEX FINGER WITHOUT FOREIGN BODY WITH DAMAGE TO NAIL, INITIAL ENCOUNTER: ICD-10-CM

## 2024-11-12 PROCEDURE — 99213 OFFICE O/P EST LOW 20 MIN: CPT | Mod: PBBFAC,PN | Performed by: PHYSICIAN ASSISTANT

## 2024-11-12 PROCEDURE — 99999 PR PBB SHADOW E&M-EST. PATIENT-LVL III: CPT | Mod: PBBFAC,,, | Performed by: PHYSICIAN ASSISTANT

## 2024-11-16 DIAGNOSIS — I10 ESSENTIAL HYPERTENSION: ICD-10-CM

## 2024-11-16 NOTE — TELEPHONE ENCOUNTER
No care due was identified.  [facility] Embedded Care Due Messages. Reference number: [id number].   11/16/2024 9:06:48 AM CST

## 2024-11-19 RX ORDER — CARVEDILOL 25 MG/1
TABLET ORAL
Qty: 90 TABLET | Refills: 3 | Status: SHIPPED | OUTPATIENT
Start: 2024-11-19

## 2025-02-20 ENCOUNTER — TELEPHONE (OUTPATIENT)
Dept: FAMILY MEDICINE | Facility: CLINIC | Age: 85
End: 2025-02-20
Payer: MEDICARE

## 2025-02-20 DIAGNOSIS — E11.22 TYPE 2 DIABETES MELLITUS WITH STAGE 3A CHRONIC KIDNEY DISEASE, WITH LONG-TERM CURRENT USE OF INSULIN: ICD-10-CM

## 2025-02-20 DIAGNOSIS — N18.32 STAGE 3B CHRONIC KIDNEY DISEASE: Primary | ICD-10-CM

## 2025-02-20 DIAGNOSIS — N18.31 TYPE 2 DIABETES MELLITUS WITH STAGE 3A CHRONIC KIDNEY DISEASE, WITH LONG-TERM CURRENT USE OF INSULIN: ICD-10-CM

## 2025-02-20 DIAGNOSIS — Z79.4 TYPE 2 DIABETES MELLITUS WITH STAGE 3A CHRONIC KIDNEY DISEASE, WITH LONG-TERM CURRENT USE OF INSULIN: ICD-10-CM

## 2025-02-20 NOTE — TELEPHONE ENCOUNTER
Pt brought in paperwork from [facility] requesting the following labs be ordered:     CMP,  A1c,  Diabetic urine screening,  TSH,   T3,  T4

## 2025-03-05 LAB
LEFT EYE DM RETINOPATHY: POSITIVE
RIGHT EYE DM RETINOPATHY: NEGATIVE

## 2025-03-06 ENCOUNTER — PATIENT OUTREACH (OUTPATIENT)
Dept: ADMINISTRATIVE | Facility: HOSPITAL | Age: 85
End: 2025-03-06
Payer: MEDICARE

## 2025-03-17 ENCOUNTER — LAB VISIT (OUTPATIENT)
Dept: LAB | Facility: HOSPITAL | Age: 85
End: 2025-03-17
Attending: FAMILY MEDICINE
Payer: MEDICARE

## 2025-03-17 DIAGNOSIS — N18.32 STAGE 3B CHRONIC KIDNEY DISEASE: ICD-10-CM

## 2025-03-17 DIAGNOSIS — E11.22 TYPE 2 DIABETES MELLITUS WITH STAGE 3A CHRONIC KIDNEY DISEASE, WITH LONG-TERM CURRENT USE OF INSULIN: ICD-10-CM

## 2025-03-17 DIAGNOSIS — I10 ESSENTIAL HYPERTENSION: ICD-10-CM

## 2025-03-17 DIAGNOSIS — Z79.4 TYPE 2 DIABETES MELLITUS WITH STAGE 3A CHRONIC KIDNEY DISEASE, WITH LONG-TERM CURRENT USE OF INSULIN: ICD-10-CM

## 2025-03-17 DIAGNOSIS — N18.31 TYPE 2 DIABETES MELLITUS WITH STAGE 3A CHRONIC KIDNEY DISEASE, WITH LONG-TERM CURRENT USE OF INSULIN: ICD-10-CM

## 2025-03-17 DIAGNOSIS — Z90.5 HISTORY OF NEPHRECTOMY: ICD-10-CM

## 2025-03-17 LAB
ALBUMIN SERPL BCP-MCNC: 4.2 G/DL (ref 3.5–5.2)
ALBUMIN/CREAT UR: 268.5 UG/MG (ref 0–30)
ALP SERPL-CCNC: 82 U/L (ref 38–126)
ALT SERPL W/O P-5'-P-CCNC: 20 U/L (ref 10–44)
ANION GAP SERPL CALC-SCNC: 8 MMOL/L (ref 8–16)
AST SERPL-CCNC: 27 U/L (ref 15–46)
BILIRUB SERPL-MCNC: 2.1 MG/DL (ref 0.1–1)
CALCIUM SERPL-MCNC: 9.8 MG/DL (ref 8.7–10.5)
CHLORIDE SERPL-SCNC: 109 MMOL/L (ref 95–110)
CHOLEST SERPL-MCNC: 169 MG/DL (ref 120–199)
CHOLEST/HDLC SERPL: 4.6 {RATIO} (ref 2–5)
CO2 SERPL-SCNC: 25 MMOL/L (ref 23–29)
CREAT SERPL-MCNC: 1.63 MG/DL (ref 0.5–1.4)
CREAT UR-MCNC: 127 MG/DL (ref 23–375)
EST. GFR  (NO RACE VARIABLE): 41 ML/MIN/1.73 M^2
ESTIMATED AVG GLUCOSE: 140 MG/DL (ref 68–131)
GLUCOSE SERPL-MCNC: 85 MG/DL (ref 70–110)
HBA1C MFR BLD: 6.5 % (ref 4–5.6)
HDLC SERPL-MCNC: 37 MG/DL (ref 40–75)
HDLC SERPL: 21.9 % (ref 20–50)
LDLC SERPL CALC-MCNC: 110.8 MG/DL (ref 63–159)
MICROALBUMIN UR DL<=1MG/L-MCNC: 341 UG/ML
NONHDLC SERPL-MCNC: 132 MG/DL
POTASSIUM SERPL-SCNC: 5.2 MMOL/L (ref 3.5–5.1)
PROT SERPL-MCNC: 7.3 G/DL (ref 6–8.4)
SODIUM SERPL-SCNC: 142 MMOL/L (ref 136–145)
T3 SERPL-MCNC: 77 NG/DL (ref 60–180)
T4 FREE SERPL-MCNC: 0.92 NG/DL (ref 0.71–1.51)
TRIGL SERPL-MCNC: 106 MG/DL (ref 30–150)
TSH SERPL DL<=0.005 MIU/L-ACNC: 1.89 UIU/ML (ref 0.4–4)
UUN UR-MCNC: 26 MG/DL (ref 2–20)

## 2025-03-17 PROCEDURE — 84480 ASSAY TRIIODOTHYRONINE (T3): CPT | Mod: PN | Performed by: FAMILY MEDICINE

## 2025-03-17 PROCEDURE — 84439 ASSAY OF FREE THYROXINE: CPT | Performed by: FAMILY MEDICINE

## 2025-03-17 PROCEDURE — 84443 ASSAY THYROID STIM HORMONE: CPT | Mod: PN | Performed by: FAMILY MEDICINE

## 2025-03-17 PROCEDURE — 83036 HEMOGLOBIN GLYCOSYLATED A1C: CPT | Performed by: FAMILY MEDICINE

## 2025-03-17 PROCEDURE — 80053 COMPREHEN METABOLIC PANEL: CPT | Mod: PN | Performed by: FAMILY MEDICINE

## 2025-03-17 PROCEDURE — 82570 ASSAY OF URINE CREATININE: CPT | Mod: PN | Performed by: FAMILY MEDICINE

## 2025-03-17 PROCEDURE — 80061 LIPID PANEL: CPT | Performed by: FAMILY MEDICINE

## 2025-03-24 ENCOUNTER — OFFICE VISIT (OUTPATIENT)
Dept: FAMILY MEDICINE | Facility: CLINIC | Age: 85
End: 2025-03-24
Payer: MEDICARE

## 2025-03-24 VITALS
WEIGHT: 211.56 LBS | HEART RATE: 57 BPM | SYSTOLIC BLOOD PRESSURE: 132 MMHG | OXYGEN SATURATION: 98 % | DIASTOLIC BLOOD PRESSURE: 76 MMHG | BODY MASS INDEX: 28.04 KG/M2 | HEIGHT: 73 IN | TEMPERATURE: 98 F

## 2025-03-24 DIAGNOSIS — Z90.5 HISTORY OF NEPHRECTOMY: ICD-10-CM

## 2025-03-24 DIAGNOSIS — H91.93 BILATERAL DEAFNESS: ICD-10-CM

## 2025-03-24 DIAGNOSIS — N18.31 TYPE 2 DIABETES MELLITUS WITH STAGE 3A CHRONIC KIDNEY DISEASE, WITH LONG-TERM CURRENT USE OF INSULIN: ICD-10-CM

## 2025-03-24 DIAGNOSIS — Z79.4 TYPE 2 DIABETES MELLITUS WITH STAGE 3A CHRONIC KIDNEY DISEASE, WITH LONG-TERM CURRENT USE OF INSULIN: ICD-10-CM

## 2025-03-24 DIAGNOSIS — Z91.89 CARDIOVASCULAR RISK FACTOR: ICD-10-CM

## 2025-03-24 DIAGNOSIS — Z00.00 ANNUAL PHYSICAL EXAM: Primary | ICD-10-CM

## 2025-03-24 DIAGNOSIS — N18.32 STAGE 3B CHRONIC KIDNEY DISEASE: ICD-10-CM

## 2025-03-24 DIAGNOSIS — I10 ESSENTIAL HYPERTENSION: ICD-10-CM

## 2025-03-24 DIAGNOSIS — C64.2 PRIMARY MALIGNANT NEOPLASM OF LEFT KIDNEY: ICD-10-CM

## 2025-03-24 DIAGNOSIS — E11.22 TYPE 2 DIABETES MELLITUS WITH STAGE 3A CHRONIC KIDNEY DISEASE, WITH LONG-TERM CURRENT USE OF INSULIN: ICD-10-CM

## 2025-03-24 RX ORDER — SILDENAFIL 100 MG/1
100 TABLET, FILM COATED ORAL DAILY PRN
Qty: 30 TABLET | Refills: 11 | Status: SHIPPED | OUTPATIENT
Start: 2025-03-24 | End: 2026-03-24

## 2025-03-24 RX ORDER — SILDENAFIL 100 MG/1
100 TABLET, FILM COATED ORAL DAILY PRN
Qty: 30 TABLET | Refills: 11 | Status: SHIPPED | OUTPATIENT
Start: 2025-03-24 | End: 2025-03-24

## 2025-03-24 NOTE — PROGRESS NOTES
"Subjective:      Patient ID: Shawn Calvo is a 84 y.o. male.    Chief Complaint: Annual Exam      Vitals:    03/24/25 1045   BP: 132/76   Pulse: (!) 57   Temp: 98 °F (36.7 °C)   TempSrc: Oral   SpO2: 98%   Weight: 95.9 kg (211 lb 8.5 oz)   Height: 6' 1" (1.854 m)        HPI   Check up of below; has DM, goes to endo; labs gaood  ROS: "heart burn" no card appt in 40 years  To card for mult risk factors of CAD, heart burn and is still active    Problem List  Problem List[1]     ALLERGIES:   Review of patient's allergies indicates:   Allergen Reactions    Amlodipine Swelling    Hydralazine analogues Other (See Comments)     headaches    Hydrochlorothiazide Other (See Comments)     RENAL FUNTION         MEDS: Current Medications[2]      History:  Current Providers as of 3/24/2025  PCP: Vinay Plascencia MD  Care Team Provider: Peter Vizcarra MD  Care Team Provider: Carmen Dominguez LPN  Care Team Provider: [facility] MD  Care Team Provider: Raciel Madsen MD  Encounter Provider: Vinay Plascencia MD, starting on Mon Mar 24, 2025 12:00 AM  Referring Provider: not found, starting on Mon Mar 24, 2025 12:00 AM  Consulting Physician: Vinay Plascencia MD, starting on Fri Feb 21, 2025  1:37 PM (Active)   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 Annual physical exam    2. Type 2 diabetes mellitus with stage 3a chronic kidney disease, with long-term current use of insulin    3. Primary malignant neoplasm of left kidney    4. Stage 3b chronic kidney disease    5. Bilateral deafness    6. Essential " hypertension    7. History of nephrectomy      Plan:        Medication List            Accurate as of March 24, 2025 11:24 AM. If you have any questions, ask your nurse or doctor.                CHANGE how you take these medications      sildenafiL 100 MG tablet  Commonly known as: VIAGRA  Take 1 tablet (100 mg total) by mouth daily as needed.  What changed:   medication strength  how much to take  Changed by: Vinay Plascencia MD            CONTINUE taking these medications      amLODIPine 2.5 MG tablet  Commonly known as: NORVASC     aspirin 81 MG Chew     carvediloL 25 MG tablet  Commonly known as: COREG  TAKE 1/2 (ONE-HALF) TABLET BY MOUTH TWICE DAILY WITH MEALS     CONTOUR TEST STRIPS Strp  Generic drug: blood sugar diagnostic  USE TO TEST BLOOD GLUCOSE TWICE DAILY     dorzolamide-timolol 2-0.5% 22.3-6.8 mg/mL ophthalmic solution  Commonly known as: COSOPT     folic acid 1 MG tablet  Commonly known as: FOLVITE     glimepiride 4 MG tablet  Commonly known as: AMARYL     latanoprost 0.005 % ophthalmic solution     lisinopriL 40 MG tablet  Commonly known as: PRINIVIL,ZESTRIL  Take 1 tablet by mouth once daily     losartan 100 MG tablet  Commonly known as: COZAAR     NOVOLIN N SUBQ     pravastatin 80 MG tablet  Commonly known as: PRAVACHOL     selenium 50 mcg Tab               Where to Get Your Medications        Information about where to get these medications is not yet available    Ask your nurse or doctor about these medications  sildenafiL 100 MG tablet       Annual physical exam    Type 2 diabetes mellitus with stage 3a chronic kidney disease, with long-term current use of insulin    Primary malignant neoplasm of left kidney    Stage 3b chronic kidney disease    Bilateral deafness    Essential hypertension    History of nephrectomy    Other orders  -     sildenafiL (VIAGRA) 100 MG tablet; Take 1 tablet (100 mg total) by mouth daily as needed.  Dispense: 30 tablet; Refill: 11             [1]   Patient Active  Problem List  Diagnosis    History of nephrectomy    CKD (chronic kidney disease) stage 2, GFR 60-89 ml/min    Essential hypertension    Bilateral deafness    Hyperlipidemia    Type 2 diabetes mellitus with stage 3 chronic kidney disease, with long-term current use of insulin    Primary malignant neoplasm of left kidney    Cellulitis and abscess of left leg    AK (actinic keratosis)    Encounter for screening for malignant neoplasm of prostate    Stage 3b chronic kidney disease   [2]   Current Outpatient Medications:     amLODIPine (NORVASC) 2.5 MG tablet, Take 2.5 mg by mouth every evening., Disp: , Rfl:     aspirin 81 MG Chew, Take 81 mg by mouth once daily., Disp: , Rfl:     carvediloL (COREG) 25 MG tablet, TAKE 1/2 (ONE-HALF) TABLET BY MOUTH TWICE DAILY WITH MEALS, Disp: 90 tablet, Rfl: 3    CONTOUR TEST STRIPS Strp, USE TO TEST BLOOD GLUCOSE TWICE DAILY, Disp: 100 each, Rfl: 0    dorzolamide-timolol 2-0.5% (COSOPT) 22.3-6.8 mg/mL ophthalmic solution, , Disp: , Rfl:     folic acid (FOLVITE) 1 MG tablet, Take 1 mg by mouth once daily., Disp: , Rfl:     glimepiride (AMARYL) 4 MG tablet, Take 4 mg by mouth daily with breakfast., Disp: , Rfl:     INSULIN NPH HUMAN ISOPHANE (NOVOLIN N SUBQ), Inject 14 Units into the skin every evening., Disp: , Rfl:     latanoprost 0.005 % ophthalmic solution, Place 1 drop into the right eye every evening., Disp: , Rfl:     lisinopriL (PRINIVIL,ZESTRIL) 40 MG tablet, Take 1 tablet by mouth once daily, Disp: 90 tablet, Rfl: 3    losartan (COZAAR) 100 MG tablet, Take 100 mg by mouth once daily., Disp: , Rfl:     pravastatin (PRAVACHOL) 80 MG tablet, Take 80 mg by mouth once daily., Disp: , Rfl:     selenium 50 mcg Tab, Take 200 mcg by mouth once daily. , Disp: , Rfl:     sildenafiL (VIAGRA) 100 MG tablet, Take 1 tablet (100 mg total) by mouth daily as needed., Disp: 30 tablet, Rfl: 11  [3]   Social History  Tobacco Use    Smoking status: Former     Passive exposure: Past    Smokeless  tobacco: Never   Substance Use Topics    Alcohol use: No

## 2025-04-15 ENCOUNTER — TELEPHONE (OUTPATIENT)
Dept: FAMILY MEDICINE | Facility: CLINIC | Age: 85
End: 2025-04-15
Payer: MEDICARE

## 2025-04-15 NOTE — TELEPHONE ENCOUNTER
DOS 3/24/2025  Is missing the PLAN    Can you please make an addendum to add the missing plan to support medical decision making for the level of service.       Please return the message to Maryana once complete so I can notify LACIE Trinidad so she can instruct the  to submit the charge

## 2025-05-26 ENCOUNTER — OFFICE VISIT (OUTPATIENT)
Dept: CARDIOLOGY | Facility: CLINIC | Age: 85
End: 2025-05-26
Payer: MEDICARE

## 2025-05-26 VITALS
WEIGHT: 209 LBS | BODY MASS INDEX: 27.7 KG/M2 | OXYGEN SATURATION: 98 % | SYSTOLIC BLOOD PRESSURE: 142 MMHG | HEIGHT: 73 IN | DIASTOLIC BLOOD PRESSURE: 63 MMHG | HEART RATE: 51 BPM

## 2025-05-26 DIAGNOSIS — E78.2 MIXED HYPERLIPIDEMIA: ICD-10-CM

## 2025-05-26 DIAGNOSIS — E11.22 TYPE 2 DIABETES MELLITUS WITH STAGE 3 CHRONIC KIDNEY DISEASE, WITH LONG-TERM CURRENT USE OF INSULIN, UNSPECIFIED WHETHER STAGE 3A OR 3B CKD: ICD-10-CM

## 2025-05-26 DIAGNOSIS — Z91.89 CARDIOVASCULAR RISK FACTOR: ICD-10-CM

## 2025-05-26 DIAGNOSIS — I10 ESSENTIAL HYPERTENSION: Primary | ICD-10-CM

## 2025-05-26 DIAGNOSIS — N18.2 CKD (CHRONIC KIDNEY DISEASE) STAGE 2, GFR 60-89 ML/MIN: ICD-10-CM

## 2025-05-26 DIAGNOSIS — N18.30 TYPE 2 DIABETES MELLITUS WITH STAGE 3 CHRONIC KIDNEY DISEASE, WITH LONG-TERM CURRENT USE OF INSULIN, UNSPECIFIED WHETHER STAGE 3A OR 3B CKD: ICD-10-CM

## 2025-05-26 DIAGNOSIS — R06.02 SHORTNESS OF BREATH: ICD-10-CM

## 2025-05-26 DIAGNOSIS — Z79.4 TYPE 2 DIABETES MELLITUS WITH STAGE 3 CHRONIC KIDNEY DISEASE, WITH LONG-TERM CURRENT USE OF INSULIN, UNSPECIFIED WHETHER STAGE 3A OR 3B CKD: ICD-10-CM

## 2025-05-26 LAB
OHS QRS DURATION: 94 MS
OHS QTC CALCULATION: 397 MS

## 2025-05-26 PROCEDURE — 93005 ELECTROCARDIOGRAM TRACING: CPT | Mod: PBBFAC,PO | Performed by: STUDENT IN AN ORGANIZED HEALTH CARE EDUCATION/TRAINING PROGRAM

## 2025-05-26 PROCEDURE — 99999 PR PBB SHADOW E&M-EST. PATIENT-LVL III: CPT | Mod: PBBFAC,,, | Performed by: INTERNAL MEDICINE

## 2025-05-26 PROCEDURE — 93010 ELECTROCARDIOGRAM REPORT: CPT | Mod: S$PBB,,, | Performed by: STUDENT IN AN ORGANIZED HEALTH CARE EDUCATION/TRAINING PROGRAM

## 2025-05-26 PROCEDURE — 99213 OFFICE O/P EST LOW 20 MIN: CPT | Mod: PBBFAC,PO | Performed by: INTERNAL MEDICINE

## 2025-05-26 PROCEDURE — 99204 OFFICE O/P NEW MOD 45 MIN: CPT | Mod: S$PBB,,, | Performed by: INTERNAL MEDICINE

## 2025-05-26 NOTE — PROGRESS NOTES
Subjective:   @Patient ID:  Shawn Calvo is a 84 y.o. male who presents for evaluation of Cardiovascular risk eval      HPI:   2025: Initial eval for cardiovascular risk.  He denies significant chest pain or ZARAGOZA. He stays active around the house.  EKG today sinus bradycardia.  He has chronic bradycardia for years and asymptomatic.  He is on Coreg.  Denies significant syncope or presyncope.  In his medication list he has both lisinopril and losartan unclear which 1 he is taking.       SH: No tobacco abuse. No etoh abuse.     FH:  No premature CAD     PMH:  left Nephrectomy 20 yrs for CA, Type II DM.       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 2025          Prior cardiovascular  Hx  --------------------------------      - EKG 2023 Sinus bradycardia with pvc             The ASCVD Risk score (Bishop EDWARDS, et al., 2019) failed to calculate for the following reasons:    The 2019 ASCVD risk score is only valid for ages 40 to 79      Patient Active Problem List    Diagnosis Date Noted    Stage 3b chronic kidney disease 2024    Primary malignant neoplasm of left kidney 2023    Cellulitis and abscess of left leg 2023    AK (actinic keratosis) 2023    Encounter for screening for malignant neoplasm of prostate 2023    Bilateral deafness 10/26/2016    Hyperlipidemia 10/26/2016    Type 2 diabetes mellitus with stage 3 chronic kidney disease, with long-term current use of insulin 10/26/2016    History of nephrectomy 10/05/2016    CKD (chronic kidney disease) stage 2, GFR 60-89 ml/min 10/05/2016    Essential hypertension 10/05/2016           Right Arm BP - Sittin/63  Left Arm BP - Sittin/74        LAST HbA1c  Lab Results   Component Value Date    HGBA1C 6.5 (H) 2025       Lipid panel  Lab Results   Component Value Date     CHOL 169 03/17/2025    CHOL 152 09/16/2024    CHOL 139 09/06/2023     Lab Results   Component Value Date    HDL 37 (L) 03/17/2025    HDL 39 (L) 09/16/2024    HDL 39 (L) 09/06/2023     Lab Results   Component Value Date    LDLCALC 110.8 03/17/2025    LDLCALC 94.6 09/16/2024    LDLCALC 84.8 09/06/2023     Lab Results   Component Value Date    TRIG 106 03/17/2025    TRIG 92 09/16/2024    TRIG 76 09/06/2023     Lab Results   Component Value Date    CHOLHDL 21.9 03/17/2025    CHOLHDL 25.7 09/16/2024    CHOLHDL 28.1 09/06/2023            Review of Systems   Constitutional: Negative for chills and fever.   HENT:  Negative for hearing loss and nosebleeds.    Eyes:  Negative for blurred vision.   Cardiovascular:         As in HPI    Respiratory:  Negative for cough, hemoptysis and shortness of breath.    Endocrine: Negative for cold intolerance and polyuria.   Hematologic/Lymphatic: Negative for bleeding problem.   Skin:  Negative for itching.   Musculoskeletal:  Negative for falls.   Gastrointestinal:  Negative for abdominal pain and hematochezia.   Genitourinary:  Negative for hematuria.   Neurological:  Negative for dizziness and loss of balance.   Psychiatric/Behavioral:  Negative for altered mental status and depression.        Objective:   Physical Exam  Constitutional:       Appearance: He is well-developed.   HENT:      Head: Normocephalic and atraumatic.   Eyes:      Conjunctiva/sclera: Conjunctivae normal.   Neck:      Vascular: No carotid bruit or JVD.   Cardiovascular:      Rate and Rhythm: Regular rhythm. Bradycardia present.      Pulses:           Carotid pulses are 2+ on the right side and 2+ on the left side.       Radial pulses are 2+ on the right side and 2+ on the left side.      Heart sounds: Normal heart sounds. No murmur heard.     No friction rub. No gallop.   Pulmonary:      Effort: Pulmonary effort is normal. No respiratory distress.      Breath sounds: Normal breath sounds. No stridor. No wheezing.    Abdominal:      General: Abdomen is flat.      Palpations: Abdomen is soft.   Musculoskeletal:      Cervical back: Neck supple.      Right lower leg: No edema.      Left lower leg: No edema.   Skin:     General: Skin is warm and dry.   Neurological:      Mental Status: He is alert and oriented to person, place, and time.   Psychiatric:         Behavior: Behavior normal.         Assessment:     1. Essential hypertension    2. Cardiovascular risk factor    3. Mixed hyperlipidemia    4. CKD (chronic kidney disease) stage 2, GFR 60-89 ml/min    5. Type 2 diabetes mellitus with stage 3 chronic kidney disease, with long-term current use of insulin, unspecified whether stage 3a or 3b CKD    6. Shortness of breath        Plan:   1. Cardiovascular risk stratification  He did not have significant symptoms.  Reports low energy levels sometimes  He is still functional around the house  We will proceed with exercise stress echo for risk stratification.   He will call us with the updated medication list      2. Sinus bradycardia  Asymptomatic with the setting with beta-blocker use  Given he is completely asymptomatic at this time.  We will monitor      3. Hypertension  Monitor BP at home and keep log  Goal BP less than 130/80          Pertinent cardiac images and EKG reviewed independently.    Continue with current medical plan and lifestyle changes.  Return sooner for concerns or questions. If symptoms persist go to the ED  I have reviewed all pertinent data including patient's medical history in detail and updated the computerized patient record.     Orders Placed This Encounter   Procedures    Stress Echo Which stress agent will be used? Treadmill Exercise; Color Flow Doppler? Yes     Standing Status:   Future     Expiration Date:   5/26/2026     Which stress agent will be used?:   Treadmill Exercise     Color Flow Doppler?:   Yes     Release to patient:   Immediate       Follow up as scheduled.       -In today's visit,  monitoring for drug toxicity was accomplished.     Greater than 50% of the visit was spent counseling, educating, and coordinating the care of the patient.     He expressed verbal understanding and agreed with the plan    Patient's Medications   New Prescriptions    No medications on file   Previous Medications    AMLODIPINE (NORVASC) 2.5 MG TABLET    Take 2.5 mg by mouth every evening.    ASPIRIN 81 MG CHEW    Take 81 mg by mouth once daily.    CARVEDILOL (COREG) 25 MG TABLET    TAKE 1/2 (ONE-HALF) TABLET BY MOUTH TWICE DAILY WITH MEALS    CONTOUR TEST STRIPS STRP    USE TO TEST BLOOD GLUCOSE TWICE DAILY    DORZOLAMIDE-TIMOLOL 2-0.5% (COSOPT) 22.3-6.8 MG/ML OPHTHALMIC SOLUTION        FOLIC ACID (FOLVITE) 1 MG TABLET    Take 1 mg by mouth once daily.    GLIMEPIRIDE (AMARYL) 4 MG TABLET    Take 4 mg by mouth daily with breakfast.    INSULIN NPH HUMAN ISOPHANE (NOVOLIN N SUBQ)    Inject 14 Units into the skin every evening.    LATANOPROST 0.005 % OPHTHALMIC SOLUTION    Place 1 drop into the right eye every evening.    LISINOPRIL (PRINIVIL,ZESTRIL) 40 MG TABLET    Take 1 tablet by mouth once daily    LOSARTAN (COZAAR) 100 MG TABLET    Take 100 mg by mouth once daily.    PRAVASTATIN (PRAVACHOL) 80 MG TABLET    Take 80 mg by mouth once daily.    SELENIUM 50 MCG TAB    Take 200 mcg by mouth once daily.     SILDENAFIL (VIAGRA) 100 MG TABLET    Take 1 tablet (100 mg total) by mouth daily as needed.   Modified Medications    No medications on file   Discontinued Medications    No medications on file        Rodrigue Dee MD, FACC, RPVI  [facility] Interventional Cardiology

## 2025-07-01 ENCOUNTER — HOSPITAL ENCOUNTER (OUTPATIENT)
Dept: CARDIOLOGY | Facility: HOSPITAL | Age: 85
Discharge: HOME | End: 2025-07-01
Attending: INTERNAL MEDICINE
Payer: MEDICARE

## 2025-07-01 ENCOUNTER — RESULTS FOLLOW-UP (OUTPATIENT)
Dept: CARDIOLOGY | Facility: CLINIC | Age: 85
End: 2025-07-01

## 2025-07-01 VITALS — HEIGHT: 73 IN | BODY MASS INDEX: 27.96 KG/M2 | WEIGHT: 211 LBS

## 2025-07-01 DIAGNOSIS — R06.02 SHORTNESS OF BREATH: ICD-10-CM

## 2025-07-01 LAB
AORTIC SIZE INDEX (SOV): 1.4 CM/M2
AORTIC SIZE INDEX: 1.3 CM/M2
APICAL FOUR CHAMBER EJECTION FRACTION: 63 %
APICAL TWO CHAMBER EJECTION FRACTION: 74 %
ASCENDING AORTA: 2.9 CM
AV INDEX (PROSTH): 0.77
AV MEAN GRADIENT: 5 MMHG
AV PEAK GRADIENT: 9 MMHG
AV VALVE AREA BY VELOCITY RATIO: 2.3 CM²
AV VALVE AREA: 2.7 CM²
AV VELOCITY RATIO: 0.67
BSA FOR ECHO PROCEDURE: 2.22 M2
CV ECHO LV RWT: 0.29 CM
CV STRESS BASE HR: 54 BPM
DIASTOLIC BLOOD PRESSURE: 80 MMHG
DOP CALC AO PEAK VEL: 1.5 M/S
DOP CALC AO VTI: 31.9 CM
DOP CALC LVOT AREA: 3.5 CM2
DOP CALC LVOT DIAMETER: 2.1 CM
DOP CALC LVOT PEAK VEL: 1 M/S
DOP CALC LVOT STROKE VOLUME: 85.5 CM3
DOP CALC MV VTI: 30.2 CM
DOP CALCLVOT PEAK VEL VTI: 24.7 CM
E WAVE DECELERATION TIME: 234 MSEC
E/A RATIO: 1.02
E/E' RATIO: 11 M/S
ECHO LV POSTERIOR WALL: 0.8 CM (ref 0.6–1.1)
FRACTIONAL SHORTENING: 46.4 % (ref 28–44)
INTERVENTRICULAR SEPTUM: 0.8 CM (ref 0.6–1.1)
IVC DIAMETER: 1.27 CM
IVRT: 88 MSEC
LEFT ATRIUM AREA SYSTOLIC (APICAL 2 CHAMBER): 20.16 CM2
LEFT ATRIUM AREA SYSTOLIC (APICAL 4 CHAMBER): 23.12 CM2
LEFT ATRIUM VOLUME INDEX MOD: 30 ML/M2
LEFT ATRIUM VOLUME MOD: 65 ML
LEFT INTERNAL DIMENSION IN SYSTOLE: 3 CM (ref 2.1–4)
LEFT VENTRICLE DIASTOLIC VOLUME INDEX: 69.55 ML/M2
LEFT VENTRICLE DIASTOLIC VOLUME: 153 ML
LEFT VENTRICLE END DIASTOLIC VOLUME APICAL 2 CHAMBER: 84.51 ML
LEFT VENTRICLE END DIASTOLIC VOLUME APICAL 4 CHAMBER: 98.11 ML
LEFT VENTRICLE END SYSTOLIC VOLUME APICAL 2 CHAMBER: 58.63 ML
LEFT VENTRICLE END SYSTOLIC VOLUME APICAL 4 CHAMBER: 61.6 ML
LEFT VENTRICLE MASS INDEX: 75 G/M2
LEFT VENTRICLE SYSTOLIC VOLUME INDEX: 15.9 ML/M2
LEFT VENTRICLE SYSTOLIC VOLUME: 35 ML
LEFT VENTRICULAR INTERNAL DIMENSION IN DIASTOLE: 5.6 CM (ref 3.5–6)
LEFT VENTRICULAR MASS: 165 G
LV LATERAL E/E' RATIO: 9.6 M/S
LV SEPTAL E/E' RATIO: 12.3 M/S
LVED V (TEICH): 152.64 ML
LVES V (TEICH): 34.52 ML
LVOT MG: 2.61 MMHG
LVOT MV: 0.78 CM/S
MV A" WAVE DURATION": 117.98 MSEC
MV MEAN GRADIENT: 2 MMHG
MV PEAK A VEL: 0.84 M/S
MV PEAK E VEL: 0.86 M/S
MV PEAK GRADIENT: 4 MMHG
MV STENOSIS PRESSURE HALF TIME: 67.9 MS
MV VALVE AREA BY CONTINUITY EQUATION: 2.83 CM2
MV VALVE AREA P 1/2 METHOD: 3.24 CM2
OHS CV CPX 1 MINUTE RECOVERY HEART RATE: 105 BPM
OHS CV CPX 85 PERCENT MAX PREDICTED HEART RATE MALE: 115
OHS CV CPX ESTIMATED METS: 6
OHS CV CPX MAX PREDICTED HEART RATE: 135
OHS CV CPX PATIENT IS FEMALE: 0
OHS CV CPX PATIENT IS MALE: 1
OHS CV CPX PEAK DIASTOLIC BLOOD PRESSURE: 73 MMHG
OHS CV CPX PEAK HEAR RATE: 123 BPM
OHS CV CPX PEAK RATE PRESSURE PRODUCT: ABNORMAL
OHS CV CPX PEAK SYSTOLIC BLOOD PRESSURE: 214 MMHG
OHS CV CPX PERCENT MAX PREDICTED HEART RATE ACHIEVED: 91
OHS CV CPX RATE PRESSURE PRODUCT PRESENTING: ABNORMAL
OHS CV RV/LV RATIO: 0.79 CM
OHS LV EJECTION FRACTION SIMPSONS BIPLANE MOD: 70 %
PISA MRMAX VEL: 4.56 M/S
PISA TR MAX VEL: 3.1 M/S
PULM VEIN S/D RATIO: 1.62
PV PEAK D VEL: 0.42 M/S
PV PEAK GRADIENT: 3 MMHG
PV PEAK S VEL: 0.68 M/S
PV PEAK VELOCITY: 0.81 M/S
RA PRESSURE ESTIMATED: 3 MMHG
RA VOL SYS: 55.4 ML
RIGHT ATRIAL AREA: 18.8 CM2
RIGHT ATRIUM END SYSTOLIC VOLUME APICAL 4 CHAMBER INDEX BSA: 24.15 ML/M2
RIGHT ATRIUM VOLUME AREA LENGTH APICAL 4 CHAMBER: 53.13 ML
RIGHT VENTRICLE DIASTOLIC BASEL DIMENSION: 4.4 CM
RV TB RVSP: 6 MMHG
RV TISSUE DOPPLER FREE WALL SYSTOLIC VELOCITY 1 (APICAL 4 CHAMBER VIEW): 12.73 CM/S
SINUS: 3.1 CM
STJ: 3.1 CM
STRESS ECHO POST EXERCISE DUR MIN: 4 MINUTES
STRESS ECHO POST EXERCISE DUR SEC: 7 SECONDS
STRESS ST DEPRESSION: 2 MM
SYSTOLIC BLOOD PRESSURE: 189 MMHG
TDI LATERAL: 0.09 M/S
TDI SEPTAL: 0.07 M/S
TDI: 0.08 M/S
TR MAX PG: 39 MMHG
TRICUSPID ANNULAR PLANE SYSTOLIC EXCURSION: 2.5 CM
TV REST PULMONARY ARTERY PRESSURE: 41 MMHG
Z-SCORE OF LEFT VENTRICULAR DIMENSION IN END DIASTOLE: -2.94
Z-SCORE OF LEFT VENTRICULAR DIMENSION IN END SYSTOLE: -3.32

## 2025-07-01 PROCEDURE — 93351 STRESS TTE COMPLETE: CPT | Mod: 26,,, | Performed by: INTERNAL MEDICINE

## 2025-07-01 PROCEDURE — 93351 STRESS TTE COMPLETE: CPT | Mod: PN

## 2025-07-01 PROCEDURE — 93325 DOPPLER ECHO COLOR FLOW MAPG: CPT | Mod: 26,,, | Performed by: INTERNAL MEDICINE

## 2025-07-01 PROCEDURE — 93320 DOPPLER ECHO COMPLETE: CPT | Mod: 26,,, | Performed by: INTERNAL MEDICINE

## 2025-07-02 ENCOUNTER — TELEPHONE (OUTPATIENT)
Dept: CARDIOLOGY | Facility: CLINIC | Age: 85
End: 2025-07-02
Payer: MEDICARE

## 2025-07-02 NOTE — TELEPHONE ENCOUNTER
Spoke with patient's wife.  Patient has an appointment in the [facility] in January 2026.  Offered a possible sooner appointment at the [facility], patient refused.  He will call if he would like to come to [place].      ----- Message from Med Assistant Owen sent at 7/2/2025  9:00 AM CDT -----  Good morning, pt request a call back. Need an appointment at the [place] location.

## 2025-07-02 NOTE — TELEPHONE ENCOUNTER
Reached out to patient and left him voice mail regarding stress test.  Please call me at your earliest convenience for test results & for a follow up.    Isacc

## 2025-07-02 NOTE — TELEPHONE ENCOUNTER
----- Message from Rodrigue Dee MD sent at 7/1/2025  3:48 PM CDT -----  I called and left a voicemail.  Please call him back and let him know his stress test results is abnormal.  We would like to have a follow up visit which can be done virtually to discuss the stress   test abnormalities and possible options.    Sincerely,  Rodrigue Dee MD.   Interventional Cardiologist  [facility], [place]   ----- Message -----  From: Carlos Curry MD  Sent: 7/1/2025   3:34 PM CDT  To: Rodrigue Dee MD

## 2025-07-02 NOTE — TELEPHONE ENCOUNTER
Reached out to patient regarding Studies and making a Virtual visit,  I left patient V/Message to call us back to schedule.    Nw

## 2025-07-03 ENCOUNTER — TELEPHONE (OUTPATIENT)
Dept: CARDIOLOGY | Facility: CLINIC | Age: 85
End: 2025-07-03
Payer: MEDICARE

## 2025-07-03 NOTE — TELEPHONE ENCOUNTER
Reached out to pt ,and  spoke to .as Per Leila Clark,  Stress test is abnormal we need to schedule you an appointment to come in and discuss options with .  Mrs Calvo Voice understanding and will share results with patient.    bessy

## 2025-07-08 ENCOUNTER — TELEPHONE (OUTPATIENT)
Dept: CARDIOLOGY | Facility: CLINIC | Age: 85
End: 2025-07-08
Payer: MEDICARE

## 2025-07-08 NOTE — TELEPHONE ENCOUNTER
----- Message from Rodrigue Dee MD sent at 7/7/2025  4:36 PM CDT -----  Regarding: RE: appointment  Can schedule after I come back. Thanks   ----- Message -----  From: Daya Calvo MA  Sent: 7/3/2025   3:27 PM CDT  To: Rodrigue Dee MD  Subject: appointment                                          We looked at your scheduled you are doubled booked, you have reached your limit. Please take a look at your schedule and let me know where you want us to triple book.    Thanks for your Help.    Nw   ----- Message -----  From: Rodrigue Dee MD  Sent: 7/1/2025   3:48 PM CDT  To: Daya Calvo MA; Leila Curran    I called and left a voicemail.  Please call him back and let him know his stress test results is abnormal.  We would like to have a follow up visit which can be done virtually to discuss the stress   test abnormalities and possible options.    Sincerely,  Rodrigue Dee MD.   Interventional Cardiologist  [facility], [place]   ----- Message -----  From: Carlos Curry MD  Sent: 7/1/2025   3:34 PM CDT  To: Rodrigue Dee MD

## 2025-07-08 NOTE — TELEPHONE ENCOUNTER
Yousef aware that patient has an appointment in the future and also has been added to the waiting list to be seen sooner.    Nw

## 2025-07-23 ENCOUNTER — TELEPHONE (OUTPATIENT)
Dept: CARDIOLOGY | Facility: CLINIC | Age: 85
End: 2025-07-23
Payer: MEDICARE

## 2025-08-11 ENCOUNTER — OFFICE VISIT (OUTPATIENT)
Dept: CARDIOLOGY | Facility: CLINIC | Age: 85
End: 2025-08-11
Payer: MEDICARE

## 2025-08-11 VITALS
HEIGHT: 72 IN | OXYGEN SATURATION: 98 % | HEART RATE: 69 BPM | SYSTOLIC BLOOD PRESSURE: 147 MMHG | BODY MASS INDEX: 28.04 KG/M2 | DIASTOLIC BLOOD PRESSURE: 69 MMHG | WEIGHT: 207 LBS

## 2025-08-11 DIAGNOSIS — R94.39 ABNORMAL CARDIOVASCULAR STRESS TEST: Primary | Chronic | ICD-10-CM

## 2025-08-11 DIAGNOSIS — E78.2 MIXED HYPERLIPIDEMIA: ICD-10-CM

## 2025-08-11 DIAGNOSIS — I10 ESSENTIAL HYPERTENSION: ICD-10-CM

## 2025-08-11 PROCEDURE — 99214 OFFICE O/P EST MOD 30 MIN: CPT | Mod: S$PBB,,, | Performed by: INTERNAL MEDICINE

## 2025-08-11 PROCEDURE — 99999 PR PBB SHADOW E&M-EST. PATIENT-LVL III: CPT | Mod: PBBFAC,,, | Performed by: INTERNAL MEDICINE

## 2025-08-11 PROCEDURE — 99213 OFFICE O/P EST LOW 20 MIN: CPT | Mod: PBBFAC,PO | Performed by: INTERNAL MEDICINE
